# Patient Record
Sex: FEMALE | Race: BLACK OR AFRICAN AMERICAN | NOT HISPANIC OR LATINO | Employment: PART TIME | URBAN - METROPOLITAN AREA
[De-identification: names, ages, dates, MRNs, and addresses within clinical notes are randomized per-mention and may not be internally consistent; named-entity substitution may affect disease eponyms.]

---

## 2018-07-13 ENCOUNTER — HOSPITAL ENCOUNTER (EMERGENCY)
Facility: HOSPITAL | Age: 23
Discharge: HOME | End: 2018-07-13
Attending: EMERGENCY MEDICINE

## 2018-07-13 VITALS
HEIGHT: 62 IN | WEIGHT: 120 LBS | HEART RATE: 68 BPM | DIASTOLIC BLOOD PRESSURE: 74 MMHG | OXYGEN SATURATION: 98 % | SYSTOLIC BLOOD PRESSURE: 116 MMHG | BODY MASS INDEX: 22.08 KG/M2 | RESPIRATION RATE: 16 BRPM | TEMPERATURE: 99 F

## 2018-07-13 DIAGNOSIS — N64.4 BREAST PAIN: ICD-10-CM

## 2018-07-13 DIAGNOSIS — A60.00 GENITAL HERPES SIMPLEX, UNSPECIFIED SITE: Primary | ICD-10-CM

## 2018-07-13 LAB
B-HCG UR QL: NEGATIVE
BACTERIA GENITAL QL WET PREP: ABNORMAL
BILIRUB UR QL STRIP: NEGATIVE
CLARITY UR: CLEAR
CLUE CELLS VAG QL WET PREP: ABNORMAL
COLOR UR: YELLOW
CTP QC/QA: YES
FILAMENT FUNGI VAG WET PREP-#/AREA: ABNORMAL
GLUCOSE UR QL STRIP: NEGATIVE
HGB UR QL STRIP: ABNORMAL
KETONES UR QL STRIP: NEGATIVE
LEUKOCYTE ESTERASE UR QL STRIP: NEGATIVE
MICROSCOPIC COMMENT: ABNORMAL
NITRITE UR QL STRIP: NEGATIVE
PH UR STRIP: 6 [PH] (ref 5–8)
PROT UR QL STRIP: NEGATIVE
RBC #/AREA URNS HPF: 10 /HPF (ref 0–4)
SP GR UR STRIP: >=1.03 (ref 1–1.03)
SPECIMEN SOURCE: ABNORMAL
T VAGINALIS GENITAL QL WET PREP: ABNORMAL
URN SPEC COLLECT METH UR: ABNORMAL
UROBILINOGEN UR STRIP-ACNC: NEGATIVE EU/DL
WBC #/AREA URNS HPF: 5 /HPF (ref 0–5)
WBC #/AREA VAG WET PREP: ABNORMAL
YEAST GENITAL QL WET PREP: ABNORMAL

## 2018-07-13 PROCEDURE — 25000003 PHARM REV CODE 250: Performed by: NURSE PRACTITIONER

## 2018-07-13 PROCEDURE — 96372 THER/PROPH/DIAG INJ SC/IM: CPT

## 2018-07-13 PROCEDURE — 87491 CHLMYD TRACH DNA AMP PROBE: CPT

## 2018-07-13 PROCEDURE — 87210 SMEAR WET MOUNT SALINE/INK: CPT

## 2018-07-13 PROCEDURE — 81000 URINALYSIS NONAUTO W/SCOPE: CPT

## 2018-07-13 PROCEDURE — 81025 URINE PREGNANCY TEST: CPT | Performed by: NURSE PRACTITIONER

## 2018-07-13 PROCEDURE — 63600175 PHARM REV CODE 636 W HCPCS: Performed by: NURSE PRACTITIONER

## 2018-07-13 PROCEDURE — 99284 EMERGENCY DEPT VISIT MOD MDM: CPT | Mod: 25

## 2018-07-13 RX ORDER — ACYCLOVIR 400 MG/1
400 TABLET ORAL 3 TIMES DAILY
Qty: 40 TABLET | Refills: 3 | Status: SHIPPED | OUTPATIENT
Start: 2018-07-13 | End: 2024-03-18

## 2018-07-13 RX ORDER — CEFTRIAXONE 250 MG/1
250 INJECTION, POWDER, FOR SOLUTION INTRAMUSCULAR; INTRAVENOUS
Status: COMPLETED | OUTPATIENT
Start: 2018-07-13 | End: 2018-07-13

## 2018-07-13 RX ORDER — AZITHROMYCIN 250 MG/1
1000 TABLET, FILM COATED ORAL
Status: COMPLETED | OUTPATIENT
Start: 2018-07-13 | End: 2018-07-13

## 2018-07-13 RX ADMIN — AZITHROMYCIN MONOHYDRATE 1000 MG: 250 TABLET ORAL at 05:07

## 2018-07-13 RX ADMIN — CEFTRIAXONE SODIUM 250 MG: 250 INJECTION, POWDER, FOR SOLUTION INTRAMUSCULAR; INTRAVENOUS at 05:07

## 2018-07-13 NOTE — DISCHARGE INSTRUCTIONS
Please take prescribed medication as labeled. Follow-up with OB-GYN within 2-3 days and return to ED if symptoms worsen or change.

## 2018-07-13 NOTE — ED NOTES
Pt presents to the ED w/ c/o of vaginal pain when walking and sitting. Pt reports that vaginal pain began on Tuesday. Pt reports slight pain during sex. Pt reports white discharge from vagina. Pt also c/o of bilateral breast pain for the past few days. Denies any discharge coming from nipple.

## 2018-07-13 NOTE — ED PROVIDER NOTES
"Encounter Date: 7/13/2018       History     Chief Complaint   Patient presents with    Breast Pain     breast pain to brandy breasts and pain to R labia.      Pt is a 22-year-old female with pmhx of genital herpes who presents to ED with bilateral breast pain x1 week and pain to her right labia x3 days.  Patient reports that her mother and grandmother both have breast cancer and that she received a mammogram last year. Patient denies nipple discharge. Patient reports that she has had "a little" vaginal discharge x 2 days with "painful" urination. Pt denies any alleviating or exacerbating factors. LMP 7/7/18. Pt denies fever, chills, neck pain/stiffness, SOB, chest pain, hematuria, abdominal pain, N/V/D, urgency, frequency, vaginal bleeding, pain, and odor. Pt denies any other complaints at this time.       The history is provided by the patient.     Review of patient's allergies indicates:  No Known Allergies  History reviewed. No pertinent past medical history.  Past Surgical History:   Procedure Laterality Date    CYST REMOVAL       History reviewed. No pertinent family history.  Social History   Substance Use Topics    Smoking status: Never Smoker    Smokeless tobacco: Never Used    Alcohol use Yes      Comment: occasionally     Review of Systems   Constitutional: Negative for chills and fever.   HENT: Negative for sore throat.    Respiratory: Negative for shortness of breath.    Cardiovascular: Negative for chest pain.   Gastrointestinal: Negative for abdominal pain, diarrhea, nausea and vomiting.   Genitourinary: Positive for dysuria and vaginal discharge. Negative for decreased urine volume, difficulty urinating, enuresis, flank pain, frequency, genital sores, hematuria, pelvic pain, urgency, vaginal bleeding and vaginal pain.        Bilateral breast pain   Musculoskeletal: Negative for back pain, neck pain and neck stiffness.   Skin: Negative for rash.   Hematological: Does not bruise/bleed easily. "       Physical Exam     Initial Vitals [07/13/18 1543]   BP Pulse Resp Temp SpO2   120/73 86 16 98.5 °F (36.9 °C) 100 %      MAP       --         Physical Exam    Nursing note and vitals reviewed.  Constitutional: Vital signs are normal. She appears well-developed and well-nourished. She is not diaphoretic.  Non-toxic appearance. She does not have a sickly appearance.   HENT:   Head: Normocephalic.   Nose: Nose normal.   Mouth/Throat: Uvula is midline, oropharynx is clear and moist and mucous membranes are normal.   Neck: Trachea normal, normal range of motion, full passive range of motion without pain and phonation normal. Neck supple.   Cardiovascular: Normal rate, regular rhythm and normal heart sounds.   Pulmonary/Chest: Effort normal and breath sounds normal. Right breast exhibits tenderness. Right breast exhibits no inverted nipple, no mass, no nipple discharge and no skin change. Left breast exhibits tenderness. Left breast exhibits no inverted nipple, no mass, no nipple discharge and no skin change. Breasts are symmetrical. There is no breast swelling.       TTP to bilateral breasts. No nipple discharge or inversion of nipples noted.    Abdominal: Soft. Normal appearance and bowel sounds are normal. There is no tenderness.   Genitourinary: Rectum normal and uterus normal. No breast bleeding. Pelvic exam was performed with patient supine. No labial fusion. There is rash and tenderness on the right labia. There is no lesion or injury on the right labia. There is rash and tenderness on the left labia. There is no lesion or injury on the left labia. Uterus is not deviated, not enlarged, not fixed and not tender. Cervix exhibits discharge. Cervix exhibits no motion tenderness and no friability. Right adnexum displays no mass, no tenderness and no fullness. Left adnexum displays no mass, no tenderness and no fullness. No erythema, tenderness or bleeding in the vagina. No foreign body in the vagina. No signs of  injury around the vagina. Vaginal discharge found.   Genitourinary Comments: Yellowish-clear d/c noted in vaginal vault and cervix. No CMT. No adnexal masses or tenderness. Small ulcerations consistent with herpes virus on bilateral labia with swelling.    Neurological: She is alert and oriented to person, place, and time. Gait normal. GCS eye subscore is 4. GCS verbal subscore is 5. GCS motor subscore is 6.   Skin: Skin is warm and intact. Capillary refill takes less than 2 seconds. No rash noted.   Psychiatric: She has a normal mood and affect.         ED Course   Procedures  Labs Reviewed   URINALYSIS, REFLEX TO URINE CULTURE - Abnormal; Notable for the following:        Result Value    Specific Gravity, UA >=1.030 (*)     Occult Blood UA 1+ (*)     All other components within normal limits    Narrative:     Preferred Collection Type->Urine, Clean Catch   VAGINAL SCREEN - Abnormal; Notable for the following:     WBC - Vaginal Screen Moderate (*)     Bacteria - Vaginal Screen Many (*)     All other components within normal limits   URINALYSIS MICROSCOPIC - Abnormal; Notable for the following:     RBC, UA 10 (*)     All other components within normal limits    Narrative:     Preferred Collection Type->Urine, Clean Catch   C. TRACHOMATIS/N. GONORRHOEAE BY AMP DNA   POCT URINE PREGNANCY          Imaging Results    None          Medical Decision Making:   Initial Assessment:   Patient presents to ED with bilateral breast pain x1 week and pain to her right labia x3 days.  Patient appears well, nontoxic.  Patient afebrile.  TTP to bilateral breasts.  No nipple discharge or nipple inversion noted. No CMT.  No adnexal masses or tenderness.   Differential Diagnosis:   UTI, pyelonephritis, STD, PID, BV, fibrocystic breasts  ED Management:  POCT pregnancy, urinalysis, pelvic exam, wet prep, GC/chlamydia, 1000 mg PO azithromycin, 250 IM rocephin   Negative UPT. Urinalysis unremarkable.  Due to the result of patient's pelvic  exam patient will be empirically treated for GC and Chlamydia in ED with azithromycin and Rocephin.  Patient is stable will be DC home with prescription for acyclovir for genital herpes.  Patient instructed to follow up with OB-GYN within 2-3 days and to return to ED if symptoms worsen or change.    Rx: acyclovir                       Clinical Impression:   The primary encounter diagnosis was Genital herpes simplex, unspecified site. A diagnosis of Breast pain was also pertinent to this visit.                             Mick Naik NP  07/13/18 2943

## 2018-07-14 LAB
C TRACH DNA SPEC QL NAA+PROBE: NOT DETECTED
N GONORRHOEA DNA SPEC QL NAA+PROBE: NOT DETECTED

## 2018-09-28 ENCOUNTER — HOSPITAL ENCOUNTER (EMERGENCY)
Facility: HOSPITAL | Age: 23
Discharge: HOME | End: 2018-09-28
Attending: EMERGENCY MEDICINE

## 2018-09-28 VITALS
HEIGHT: 62 IN | HEART RATE: 78 BPM | OXYGEN SATURATION: 100 % | DIASTOLIC BLOOD PRESSURE: 80 MMHG | TEMPERATURE: 99 F | BODY MASS INDEX: 24.51 KG/M2 | RESPIRATION RATE: 18 BRPM | SYSTOLIC BLOOD PRESSURE: 128 MMHG | WEIGHT: 133.19 LBS

## 2018-09-28 DIAGNOSIS — N89.8 VAGINAL DISCHARGE: Primary | ICD-10-CM

## 2018-09-28 LAB
B-HCG UR QL: NEGATIVE
BACTERIA GENITAL QL WET PREP: ABNORMAL
BILIRUB UR QL STRIP: NEGATIVE
CLARITY UR: CLEAR
CLUE CELLS VAG QL WET PREP: ABNORMAL
COLOR UR: YELLOW
CTP QC/QA: YES
FILAMENT FUNGI VAG WET PREP-#/AREA: ABNORMAL
GLUCOSE UR QL STRIP: NEGATIVE
HGB UR QL STRIP: ABNORMAL
KETONES UR QL STRIP: NEGATIVE
LEUKOCYTE ESTERASE UR QL STRIP: NEGATIVE
NITRITE UR QL STRIP: NEGATIVE
PH UR STRIP: 7 [PH] (ref 5–8)
PROT UR QL STRIP: NEGATIVE
SP GR UR STRIP: 1.02 (ref 1–1.03)
SPECIMEN SOURCE: ABNORMAL
T VAGINALIS GENITAL QL WET PREP: ABNORMAL
URN SPEC COLLECT METH UR: ABNORMAL
UROBILINOGEN UR STRIP-ACNC: 1 EU/DL
WBC #/AREA VAG WET PREP: ABNORMAL
YEAST GENITAL QL WET PREP: ABNORMAL

## 2018-09-28 PROCEDURE — 81025 URINE PREGNANCY TEST: CPT | Performed by: NURSE PRACTITIONER

## 2018-09-28 PROCEDURE — 96372 THER/PROPH/DIAG INJ SC/IM: CPT

## 2018-09-28 PROCEDURE — 63600175 PHARM REV CODE 636 W HCPCS: Performed by: NURSE PRACTITIONER

## 2018-09-28 PROCEDURE — 87210 SMEAR WET MOUNT SALINE/INK: CPT

## 2018-09-28 PROCEDURE — 87491 CHLMYD TRACH DNA AMP PROBE: CPT

## 2018-09-28 PROCEDURE — 81003 URINALYSIS AUTO W/O SCOPE: CPT

## 2018-09-28 PROCEDURE — 25000003 PHARM REV CODE 250: Performed by: NURSE PRACTITIONER

## 2018-09-28 PROCEDURE — 99284 EMERGENCY DEPT VISIT MOD MDM: CPT | Mod: 25

## 2018-09-28 RX ORDER — AZITHROMYCIN 250 MG/1
1000 TABLET, FILM COATED ORAL
Status: COMPLETED | OUTPATIENT
Start: 2018-09-28 | End: 2018-09-28

## 2018-09-28 RX ORDER — CEFTRIAXONE 250 MG/1
250 INJECTION, POWDER, FOR SOLUTION INTRAMUSCULAR; INTRAVENOUS
Status: COMPLETED | OUTPATIENT
Start: 2018-09-28 | End: 2018-09-28

## 2018-09-28 RX ADMIN — AZITHROMYCIN MONOHYDRATE 1000 MG: 250 TABLET ORAL at 04:09

## 2018-09-28 RX ADMIN — CEFTRIAXONE SODIUM 250 MG: 250 INJECTION, POWDER, FOR SOLUTION INTRAMUSCULAR; INTRAVENOUS at 04:09

## 2018-09-28 NOTE — ED PROVIDER NOTES
Encounter Date: 9/28/2018       History     Chief Complaint   Patient presents with    Female  Problem     pelvic pain, vaginal discharge with foul smell for 3 days     LMP 9/24/18.      The history is provided by the patient.   Female  Problem   Primary symptoms include discharge, genital odor.    Primary symptoms include no pelvic pain, no fever, no genital lesions, no genital pain, no genital rash, no genital itching, no dysuria, and no vaginal bleeding.   This is a new problem. Illness onset: one week. The problem occurs constantly. The problem has been unchanged. The symptoms occur after intercourse (unprotected sex two weeks ago). She is not pregnant. The discharge was white, thick and malodorous. Pertinent negatives include no anorexia, no abdominal swelling, no abdominal pain, no constipation, no diarrhea, no nausea, no vomiting and no frequency. She has tried nothing for the symptoms. Sexual activity: sexually active. There is a concern regarding sexually transmitted diseases. She uses oral contraceptives for contraception. Associated medical issues include STD and herpes simplex. Associated medical issues do not include PID, vaginosis, ovarian cysts or ectopic pregnancy.     Review of patient's allergies indicates:  No Known Allergies  History reviewed. No pertinent past medical history.  Past Surgical History:   Procedure Laterality Date    CYST REMOVAL       History reviewed. No pertinent family history.  Social History     Tobacco Use    Smoking status: Never Smoker    Smokeless tobacco: Never Used   Substance Use Topics    Alcohol use: Yes     Comment: occasionally    Drug use: No     Review of Systems   Constitutional: Negative for fever.   Gastrointestinal: Negative for abdominal pain, anorexia, constipation, diarrhea, nausea and vomiting.   Genitourinary: Positive for vaginal discharge. Negative for decreased urine volume, difficulty urinating, dysuria, flank pain, frequency, genital  sores, hematuria, pelvic pain, urgency, vaginal bleeding and vaginal pain.        Vaginal odor   Musculoskeletal: Negative for back pain, gait problem, neck pain and neck stiffness.   Skin: Negative for rash.   Hematological: Does not bruise/bleed easily.   All other systems reviewed and are negative.      Physical Exam     Initial Vitals [09/28/18 1521]   BP Pulse Resp Temp SpO2   120/66 92 16 98.2 °F (36.8 °C) 100 %      MAP       --         Physical Exam    Vitals reviewed.  Constitutional: She appears well-developed and well-nourished. She is not diaphoretic. She is cooperative.  Non-toxic appearance. She does not have a sickly appearance.   No acute distress.   HENT:   Head: Normocephalic.   Eyes: Pupils are equal, round, and reactive to light.   Neck: Normal range of motion, full passive range of motion without pain and phonation normal. Neck supple.   Cardiovascular: Regular rhythm.   Pulmonary/Chest: No respiratory distress.   Abdominal:   Abdomen soft and non-tender.  Normal bowel sounds.  No guarding, rigidity, rebound.  No CVA tenderness.     Genitourinary:   Genitourinary Comments: No lesions or vesicles noted.  Thick, white discharge noted in vaginal vault and cervix. No CMT.   No adnexal masses or tenderness.    Neurological: She is alert and oriented to person, place, and time.   Steady gait.    Skin: Skin is warm, dry and intact. No rash noted.   No rash on palms or soles.   Psychiatric: She has a normal mood and affect.         ED Course   Procedures  Labs Reviewed   URINALYSIS, REFLEX TO URINE CULTURE - Abnormal; Notable for the following components:       Result Value    Occult Blood UA Trace (*)     All other components within normal limits    Narrative:     Preferred Collection Type->Urine, Clean Catch   VAGINAL SCREEN - Abnormal; Notable for the following components:    Bacteria - Vaginal Screen Few (*)     All other components within normal limits   C. TRACHOMATIS/N. GONORRHOEAE BY AMP DNA    POCT URINE PREGNANCY          Imaging Results    None          Medical Decision Making:   Initial Assessment:   Patient presents to ED with vaginal discharge and odor x1 week.  Appears well, nontoxic.  Afebrile. Abdomen soft and non-tender. Normal bowel sounds.  No guarding, rigidity, rebound.  No CVA tenderness. No lesions or vesicles noted.  Thick, white discharge noted in vaginal vault and cervix.  No CMT.  No adnexal masses or tenderness.  Steady gait.   Differential Diagnosis:   UTI, pyelonephritis, BV, PID, GC/chlamydia, genital herpes   Clinical Tests:   Lab Tests: Ordered and Reviewed  ED Management:  POCT pregnancy, urinalysis, pelvic exam, wet prep, GC/chalymdia, 1000 mg PO azithromycin, 250 IM rocephin   Negative UPT. Urinalysis negative for nitrates and leokocytosis. Wet prep unremarkable. Pt requests to be treated for GC/chalmydia and given 1000 mg PO azithromycin 250 IM Rocephin in ED.  No signs of pyelonephritis or acute surgical abdomen.  Patient is stable and will be DC home.  Patient instructed to follow up with daughters of [facility] tomorrow and to return to ED for any concerns.  Patient verbalizes DC instructions and is in compliance and agreement with treatment plan.              Attending Attestation:     Physician Attestation Statement for NP/PA:   I discussed this assessment and plan of this patient with the NP/PA, but I did not personally examine the patient. The face to face encounter was performed by the NP/PA.    Other NP/PA Attestation Additions:    History of Present Illness: Vaginal discharge    Medical Decision Making: Treat empirically with azithromycin and rocephin.  Gonorrhea/chlamydia pending.  No evidence of PID on exam.                    Clinical Impression:   The encounter diagnosis was Vaginal discharge.                             Mick Naik NP  09/28/18 172       Alta Berger MD  09/28/18 1818

## 2018-09-28 NOTE — ED NOTES
Pt here c/o having unprotected sex and now having vaginal odor and discharge that is foul. No other c/o. Denies urinary s/s.

## 2018-09-29 LAB
C TRACH DNA SPEC QL NAA+PROBE: NOT DETECTED
N GONORRHOEA DNA SPEC QL NAA+PROBE: NOT DETECTED

## 2019-05-20 ENCOUNTER — HOSPITAL ENCOUNTER (EMERGENCY)
Facility: HOSPITAL | Age: 24
Discharge: HOME | End: 2019-05-20
Attending: SURGERY

## 2019-05-20 VITALS
WEIGHT: 125 LBS | RESPIRATION RATE: 18 BRPM | BODY MASS INDEX: 23 KG/M2 | DIASTOLIC BLOOD PRESSURE: 74 MMHG | HEIGHT: 62 IN | HEART RATE: 98 BPM | TEMPERATURE: 98 F | OXYGEN SATURATION: 100 % | SYSTOLIC BLOOD PRESSURE: 116 MMHG

## 2019-05-20 DIAGNOSIS — R52 PAIN: ICD-10-CM

## 2019-05-20 DIAGNOSIS — L03.119 CELLULITIS AND ABSCESS OF FOOT: Primary | ICD-10-CM

## 2019-05-20 DIAGNOSIS — L02.619 CELLULITIS AND ABSCESS OF FOOT: Primary | ICD-10-CM

## 2019-05-20 PROCEDURE — 25000003 PHARM REV CODE 250: Mod: ER | Performed by: SURGERY

## 2019-05-20 PROCEDURE — 99284 EMERGENCY DEPT VISIT MOD MDM: CPT | Mod: 25,ER

## 2019-05-20 RX ORDER — SULFAMETHOXAZOLE AND TRIMETHOPRIM 800; 160 MG/1; MG/1
1 TABLET ORAL 2 TIMES DAILY
Qty: 14 TABLET | Refills: 0 | Status: SHIPPED | OUTPATIENT
Start: 2019-05-20 | End: 2019-05-27

## 2019-05-20 RX ORDER — BUTALBITAL, ACETAMINOPHEN, CAFFEINE AND CODEINE PHOSPHATE 300; 50; 40; 30 MG/1; MG/1; MG/1; MG/1
1 CAPSULE ORAL EVERY 4 HOURS
Qty: 12 CAPSULE | Refills: 0 | Status: SHIPPED | OUTPATIENT
Start: 2019-05-20 | End: 2020-10-08

## 2019-05-20 RX ORDER — HYDROCODONE BITARTRATE AND ACETAMINOPHEN 10; 325 MG/1; MG/1
1 TABLET ORAL
Status: COMPLETED | OUTPATIENT
Start: 2019-05-20 | End: 2019-05-20

## 2019-05-20 RX ORDER — CEPHALEXIN 500 MG/1
500 CAPSULE ORAL EVERY 8 HOURS
Qty: 21 CAPSULE | Refills: 0 | Status: SHIPPED | OUTPATIENT
Start: 2019-05-20 | End: 2019-05-27

## 2019-05-20 RX ORDER — CEPHALEXIN 500 MG/1
500 CAPSULE ORAL
Status: COMPLETED | OUTPATIENT
Start: 2019-05-20 | End: 2019-05-20

## 2019-05-20 RX ADMIN — HYDROCODONE BITARTRATE AND ACETAMINOPHEN 1 TABLET: 10; 325 TABLET ORAL at 08:05

## 2019-05-20 RX ADMIN — CEPHALEXIN 500 MG: 500 CAPSULE ORAL at 08:05

## 2019-05-20 NOTE — ED PROVIDER NOTES
Encounter Date: 5/20/2019       History     Chief Complaint   Patient presents with    Foot Pain     Left foot/pinky toe pain since Saturday after wearing tight shoes at the club.  (+) swelling/redness/pain, (+) cap refill and distal pulses     Left 4th and 5th toe pain and swelling since Saturday night after wearing tight shoes.  Denies trauma    The history is provided by the patient.   Foot Pain   This is a new problem. The current episode started 2 days ago. The problem occurs constantly. The problem has been gradually worsening. Pertinent negatives include no chest pain, no abdominal pain, no headaches and no shortness of breath. The symptoms are aggravated by walking and exertion. Nothing relieves the symptoms. She has tried nothing for the symptoms. The treatment provided no relief.     Review of patient's allergies indicates:  No Known Allergies  History reviewed. No pertinent past medical history.  Past Surgical History:   Procedure Laterality Date    CYST REMOVAL       History reviewed. No pertinent family history.  Social History     Tobacco Use    Smoking status: Never Smoker    Smokeless tobacco: Never Used   Substance Use Topics    Alcohol use: Yes     Comment: occasionally    Drug use: No     Review of Systems   Constitutional: Negative.    HENT: Negative.    Eyes: Negative.    Respiratory: Negative.  Negative for shortness of breath.    Cardiovascular: Negative.  Negative for chest pain.   Gastrointestinal: Negative.  Negative for abdominal pain.   Endocrine: Negative.    Genitourinary: Negative.    Musculoskeletal: Positive for joint swelling.   Skin: Positive for rash.   Allergic/Immunologic: Negative.    Neurological: Negative.  Negative for headaches.   Hematological: Negative.    Psychiatric/Behavioral: Negative.        Physical Exam     Initial Vitals [05/20/19 0607]   BP Pulse Resp Temp SpO2   119/82 100 18 98.5 °F (36.9 °C) 100 %      MAP       --         Physical Exam    Nursing note  and vitals reviewed.  Constitutional: She appears well-developed.   HENT:   Head: Normocephalic.   Cardiovascular: Intact distal pulses.   Musculoskeletal: She exhibits tenderness.        Feet:    Skin: Skin is warm. There is erythema.   Psychiatric: She has a normal mood and affect.         ED Course   Procedures  Labs Reviewed - No data to display       Imaging Results          X-Ray Foot Complete Left (Final result)  Result time 05/20/19 08:05:14    Final result by LISBETH Mujica Sr., MD (05/20/19 08:05:14)                 Impression:      Normal study.      Electronically signed by: Robert Mujica MD  Date:    05/20/2019  Time:    08:05             Narrative:    EXAMINATION:  XR FOOT COMPLETE 3 VIEW LEFT    CLINICAL HISTORY:  Pain, unspecified    COMPARISON:  None    FINDINGS:  There is no fracture. There is no dislocation.                                 Medical Decision Making:   Initial Assessment:   Cellulitis of left 4th and 5th toe  ED Management:  Patient was given Keflex and Bactrim for strep and staph coverage                      Clinical Impression:       ICD-10-CM ICD-9-CM   1. Cellulitis and abscess of foot L03.119 682.7    L02.619    2. Pain R52 780.96         Disposition:   Disposition: Discharged  Condition: Stable                        KAMLESH Lloyd III, MD  05/20/19 3429

## 2019-06-06 ENCOUNTER — HOSPITAL ENCOUNTER (EMERGENCY)
Facility: HOSPITAL | Age: 24
Discharge: HOME | End: 2019-06-06
Attending: EMERGENCY MEDICINE

## 2019-06-06 VITALS
TEMPERATURE: 98 F | HEIGHT: 62 IN | HEART RATE: 95 BPM | OXYGEN SATURATION: 100 % | DIASTOLIC BLOOD PRESSURE: 87 MMHG | RESPIRATION RATE: 18 BRPM | WEIGHT: 135 LBS | SYSTOLIC BLOOD PRESSURE: 128 MMHG | BODY MASS INDEX: 24.84 KG/M2

## 2019-06-06 DIAGNOSIS — B35.3 TINEA PEDIS OF BOTH FEET: Primary | ICD-10-CM

## 2019-06-06 PROCEDURE — 99283 EMERGENCY DEPT VISIT LOW MDM: CPT | Mod: ER

## 2019-06-06 RX ORDER — TERBINAFINE HYDROCHLORIDE 250 MG/1
250 TABLET ORAL DAILY
Qty: 10 TABLET | Refills: 2 | Status: SHIPPED | OUTPATIENT
Start: 2019-06-06 | End: 2019-06-16

## 2019-06-06 RX ORDER — TOLNAFTATE 10 MG/G
CREAM TOPICAL 2 TIMES DAILY
Refills: 0 | COMMUNITY
Start: 2019-06-06 | End: 2020-10-08

## 2019-06-07 NOTE — ED PROVIDER NOTES
Encounter Date: 6/6/2019       History     Chief Complaint   Patient presents with    Foot Pain     R foot pain X 2 days.  Erythema, swelling noted.  Wound between 2nd and 3rd toe noted. Irritation noted between all other toes.  Pt denies injury.     The history is provided by the patient.   Foot Injury    The incident occurred at home. The injury mechanism is unknown. The incident occurred several days ago. The pain is present in the left foot and right foot. The quality of the pain is described as aching. The pain is at a severity of 3/10. The pain has been constant since onset. Associated symptoms include tingling. Pertinent negatives include no numbness, no inability to bear weight, no loss of motion, no muscle weakness and no loss of sensation. Associated symptoms comments: Itching  . She reports no foreign bodies present. Nothing aggravates the symptoms. Treatments tried: antibiotics. The treatment provided no relief.     Review of patient's allergies indicates:  No Known Allergies  History reviewed. No pertinent past medical history.  Past Surgical History:   Procedure Laterality Date    CYST REMOVAL       History reviewed. No pertinent family history.  Social History     Tobacco Use    Smoking status: Never Smoker    Smokeless tobacco: Never Used   Substance Use Topics    Alcohol use: Yes     Comment: occasionally    Drug use: No     Review of Systems   Skin: Positive for wound.   Neurological: Positive for tingling. Negative for numbness.   All other systems reviewed and are negative.      Physical Exam     Initial Vitals [06/06/19 2045]   BP Pulse Resp Temp SpO2   128/87 95 18 97.9 °F (36.6 °C) 100 %      MAP       --         Physical Exam    Nursing note and vitals reviewed.  Constitutional: She appears well-developed and well-nourished.   HENT:   Head: Normocephalic and atraumatic.   Eyes: Conjunctivae and EOM are normal.   Neck: Normal range of motion. Neck supple.   Cardiovascular: Normal rate,  regular rhythm and normal heart sounds.   Pulmonary/Chest: Breath sounds normal. No respiratory distress. She has no wheezes. She has no rhonchi. She has no rales.   Abdominal: Soft. There is no tenderness. There is no rebound and no guarding.   Musculoskeletal: Normal range of motion.   Neurological: She is alert and oriented to person, place, and time. GCS score is 15. GCS eye subscore is 4. GCS verbal subscore is 5. GCS motor subscore is 6.   Skin: Skin is warm and dry. Capillary refill takes less than 2 seconds.        Psychiatric: She has a normal mood and affect. Her behavior is normal. Judgment and thought content normal.         ED Course   Procedures  Labs Reviewed - No data to display       Imaging Results    None                               Clinical Impression:       ICD-10-CM ICD-9-CM   1. Tinea pedis of both feet B35.3 110.4         Disposition:   Disposition: Discharged  Condition: Stable                        Saige Burroughs MD  06/06/19 [id number]

## 2020-10-08 ENCOUNTER — HOSPITAL ENCOUNTER (EMERGENCY)
Facility: HOSPITAL | Age: 25
Discharge: HOME | End: 2020-10-08
Attending: EMERGENCY MEDICINE

## 2020-10-08 VITALS
TEMPERATURE: 98 F | HEIGHT: 61 IN | RESPIRATION RATE: 16 BRPM | SYSTOLIC BLOOD PRESSURE: 125 MMHG | WEIGHT: 140 LBS | DIASTOLIC BLOOD PRESSURE: 82 MMHG | BODY MASS INDEX: 26.43 KG/M2 | OXYGEN SATURATION: 99 % | HEART RATE: 82 BPM

## 2020-10-08 DIAGNOSIS — R07.89 CHEST WALL PAIN: Primary | ICD-10-CM

## 2020-10-08 DIAGNOSIS — R07.9 CHEST PAIN: ICD-10-CM

## 2020-10-08 DIAGNOSIS — R07.9 LEFT-SIDED CHEST PAIN: ICD-10-CM

## 2020-10-08 LAB
B-HCG UR QL: NEGATIVE
BILIRUB UR QL STRIP: NEGATIVE
CLARITY UR: CLEAR
COLOR UR: YELLOW
CTP QC/QA: YES
GLUCOSE UR QL STRIP: NEGATIVE
HGB UR QL STRIP: NEGATIVE
KETONES UR QL STRIP: NEGATIVE
LEUKOCYTE ESTERASE UR QL STRIP: NEGATIVE
NITRITE UR QL STRIP: NEGATIVE
PH UR STRIP: >8 [PH] (ref 5–8)
PROT UR QL STRIP: NEGATIVE
SP GR UR STRIP: 1.02 (ref 1–1.03)
URN SPEC COLLECT METH UR: ABNORMAL
UROBILINOGEN UR STRIP-ACNC: NEGATIVE EU/DL

## 2020-10-08 PROCEDURE — 81003 URINALYSIS AUTO W/O SCOPE: CPT

## 2020-10-08 PROCEDURE — 99285 EMERGENCY DEPT VISIT HI MDM: CPT | Mod: 25

## 2020-10-08 PROCEDURE — 81025 URINE PREGNANCY TEST: CPT | Performed by: EMERGENCY MEDICINE

## 2020-10-08 PROCEDURE — 93010 ELECTROCARDIOGRAM REPORT: CPT | Mod: ,,, | Performed by: INTERNAL MEDICINE

## 2020-10-08 PROCEDURE — 93010 EKG 12-LEAD: ICD-10-PCS | Mod: ,,, | Performed by: INTERNAL MEDICINE

## 2020-10-08 PROCEDURE — 93005 ELECTROCARDIOGRAM TRACING: CPT

## 2020-10-08 PROCEDURE — 25000003 PHARM REV CODE 250: Performed by: EMERGENCY MEDICINE

## 2020-10-08 RX ORDER — IBUPROFEN 400 MG/1
400 TABLET ORAL
Status: COMPLETED | OUTPATIENT
Start: 2020-10-08 | End: 2020-10-08

## 2020-10-08 RX ORDER — IBUPROFEN 400 MG/1
400 TABLET ORAL EVERY 6 HOURS PRN
Qty: 20 TABLET | Refills: 0 | Status: SHIPPED | OUTPATIENT
Start: 2020-10-08 | End: 2022-06-16 | Stop reason: CLARIF

## 2020-10-08 RX ADMIN — IBUPROFEN 400 MG: 400 TABLET, FILM COATED ORAL at 09:10

## 2020-10-08 NOTE — ED PROVIDER NOTES
Encounter Date: 10/8/2020    SCRIBE #1 NOTE: I, Dary Cesar, am scribing for, and in the presence of, Dr. Lainez.       History     Chief Complaint   Patient presents with    Chest Pain     Sharp, left sided chest pain since starting work today. States pain began after liftiing items at work. Presents in no distress - states pain worse with movement. No distress noted.      Nicola Grande is a 24 y.o. female who  has no past medical history on file.    The patient presents to the ED due to chest pain. Onset of pain began this morning and is located to the left chest wall area/ under the left breast. Patient took 2 GasX without relief. Patient has had chest pain like this before and symptoms resolved on their own. Patient reports increased pain with inhalation. Patient denies cough, fever, nausea, vomiting, diarrhea, rash or swelling anywhere. Patient notes having an birth control implant that is  in her left arm. She denies any surgeries in the past. Patient denies any recent travel.     The history is provided by the patient. No  was used.     Review of patient's allergies indicates:  No Known Allergies  History reviewed. No pertinent past medical history.  Past Surgical History:   Procedure Laterality Date    CYST REMOVAL       History reviewed. No pertinent family history.  Social History     Tobacco Use    Smoking status: Never Smoker    Smokeless tobacco: Never Used   Substance Use Topics    Alcohol use: Yes     Comment: occasionally    Drug use: No     Review of Systems   Constitutional: Negative for fever.   HENT: Negative for sore throat.    Respiratory: Negative for shortness of breath.    Cardiovascular: Positive for chest pain.   Gastrointestinal: Negative for nausea.   Genitourinary: Negative for dysuria.   Musculoskeletal: Negative for back pain.   Skin: Negative for rash.   Neurological: Negative for weakness.   Hematological: Does not bruise/bleed easily.        Physical Exam     Initial Vitals [10/08/20 0903]   BP Pulse Resp Temp SpO2   125/82 82 16 98.4 °F (36.9 °C) 99 %      MAP       --         Physical Exam    Nursing note and vitals reviewed.  Constitutional: She appears well-developed and well-nourished. She is not diaphoretic. No distress.   HENT:   Head: Normocephalic and atraumatic.   Eyes: Conjunctivae and EOM are normal.   Neck: Normal range of motion. Neck supple.   Cardiovascular: Normal rate, regular rhythm and normal heart sounds.   Pulmonary/Chest: Breath sounds normal. No respiratory distress.   Abdominal: Soft. There is no abdominal tenderness.   Musculoskeletal: Normal range of motion. Tenderness present. No edema.      Comments: Mild tenderness to palpation to left chest wall.    Neurological: She is alert and oriented to person, place, and time. She has normal strength.   Skin: Skin is warm and dry. Capillary refill takes less than 2 seconds.         ED Course   Procedures  Labs Reviewed   URINALYSIS, REFLEX TO URINE CULTURE - Abnormal; Notable for the following components:       Result Value    pH, UA >8.0 (*)     All other components within normal limits    Narrative:     Specimen Source->Urine   POCT URINE PREGNANCY        ECG Results          EKG 12-lead (Final result)  Result time 10/08/20 12:28:01    Final result by Interface, Lab In [facility] (10/08/20 12:28:01)                 Narrative:    Test Reason : R07.9,    Vent. Rate : 076 BPM     Atrial Rate : 076 BPM     P-R Int : 126 ms          QRS Dur : 062 ms      QT Int : 384 ms       P-R-T Axes : 070 054 030 degrees     QTc Int : 432 ms    Normal sinus rhythm with sinus arrhythmia  Normal ECG  When compared with ECG of 01-SEP-2013 20:56,  No significant change was found  Confirmed by Maik GARCIA MD, Luis Enrique CERON ([id number]) on 10/8/2020 12:27:52 PM    Referred By: [email or web address]   SELF           Confirmed By:Luis Enrique Pleitez III, MD                            Imaging Results          X-Ray Chest AP  Portable (Final result)  Result time 10/08/20 10:16:02    Final result by Loren Majano MD (10/08/20 10:16:02)                 Impression:      No lung parenchymal or pleural abnormality or cardiomegaly.      Electronically signed by: Loren Majano  Date:    10/08/2020  Time:    10:16             Narrative:    EXAMINATION:  XR CHEST AP PORTABLE    CLINICAL HISTORY:  Chest pain, unspecified    TECHNIQUE:  Single frontal view of the chest was performed.    COMPARISON:  09/01/2013 PA lateral chest    FINDINGS:  The cardiomediastinal silhouette is stable and nonenlarged.  The lungs appear clear.  There is no pleural effusion or pneumothorax.  Osseous structures appear intact.                                 Medical Decision Making:   History:   Old Medical Records: I decided to obtain old medical records.  Initial Assessment:   This is a healthy 25 y/o female who presents with left sided chest pain. [id number].   Differential Diagnosis:   Muscle sprain, muscle strain, costochondritis, pneumothorax, considered although lower likeleyhood of PE at this time. (PERC negative)  Independently Interpreted Test(s):   I have ordered and independently interpreted X-rays - see prior notes.  I have ordered and independently interpreted EKG Reading(s) - see prior notes  Clinical Tests:   Lab Tests: Ordered and Reviewed  Radiological Study: Ordered and Reviewed  Medical Tests: Ordered and Reviewed    Additional MDM:   PERC Rule:   Age is greater than or equal to 50 = 0.0  Heart Rate is greater than or equal to 100 = 0.0  SaO2 on room air < 95% = 0.0  Unilateral leg swelling = 0.0  Hemoptysis = 0.0  Recent surgery or trauma = 0.0  Prior PE or DVT =  0.0  Hormone use = 0.00  PERC Score = 0                          Clinical Impression:       ICD-10-CM ICD-9-CM   1. Chest wall pain  R07.89 786.52   2. Chest pain  R07.9 786.50   3. Left-sided chest pain  R07.9 786.50                      Disposition:   Disposition:  Discharged  Condition: Stable     ED Disposition Condition    Discharge Stable        ED Prescriptions     Medication Sig Dispense Start Date End Date Auth. Provider    ibuprofen (ADVIL,MOTRIN) 400 MG tablet Take 1 tablet (400 mg total) by mouth every 6 (six) hours as needed. 20 tablet 10/8/2020  Bjorn Loza Jr., MD        Follow-up Information     Follow up With Specialties Details Why Contact Info    [facility]  If symptoms worsen [address] [id number] [phone]                            I, Bjorn Loza,  personally performed the services described in this documentation. All medical record entries made by the scribe were at my direction and in my presence.  I have reviewed the chart and agree that the record reflects my personal performance and is accurate and complete. Bjorn Loza Jr., MD  10/09/20 5222

## 2020-10-08 NOTE — ED TRIAGE NOTES
Pt presents to ED with C/P R /chest pain with onset this am while at work. 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 Pt states more pain with deep breaths. Pt states pain is 8/10 at this time. Pt states the pain is sharp to the area.

## 2020-10-08 NOTE — ED TRIAGE NOTES
Describes sharp, left sided chest pain that is worse with movement. Pain began while at work today, shortly after lifting some objects on shelf. No SOB or h/o cough/fever. No distress noted.

## 2021-03-10 ENCOUNTER — HOSPITAL ENCOUNTER (EMERGENCY)
Facility: HOSPITAL | Age: 26
Discharge: HOME | End: 2021-03-10
Attending: EMERGENCY MEDICINE
Payer: COMMERCIAL

## 2021-03-10 VITALS
HEART RATE: 98 BPM | WEIGHT: 140 LBS | TEMPERATURE: 98 F | OXYGEN SATURATION: 99 % | HEIGHT: 62 IN | SYSTOLIC BLOOD PRESSURE: 127 MMHG | RESPIRATION RATE: 18 BRPM | DIASTOLIC BLOOD PRESSURE: 72 MMHG | BODY MASS INDEX: 25.76 KG/M2

## 2021-03-10 DIAGNOSIS — N72 CERVICITIS: Primary | ICD-10-CM

## 2021-03-10 LAB
B-HCG UR QL: NEGATIVE
BACTERIA #/AREA URNS HPF: ABNORMAL /HPF
BACTERIA GENITAL QL WET PREP: ABNORMAL
BILIRUB UR QL STRIP: NEGATIVE
CLARITY UR: ABNORMAL
CLUE CELLS VAG QL WET PREP: ABNORMAL
COLOR UR: ABNORMAL
CTP QC/QA: YES
FILAMENT FUNGI VAG WET PREP-#/AREA: ABNORMAL
GLUCOSE UR QL STRIP: NEGATIVE
HGB UR QL STRIP: ABNORMAL
HYALINE CASTS #/AREA URNS LPF: 0 /LPF
KETONES UR QL STRIP: NEGATIVE
LEUKOCYTE ESTERASE UR QL STRIP: ABNORMAL
MICROSCOPIC COMMENT: ABNORMAL
NITRITE UR QL STRIP: NEGATIVE
PH UR STRIP: 6 [PH] (ref 5–8)
PROT UR QL STRIP: ABNORMAL
RBC #/AREA URNS HPF: >100 /HPF (ref 0–4)
SP GR UR STRIP: 1.03 (ref 1–1.03)
SPECIMEN SOURCE: ABNORMAL
SQUAMOUS #/AREA URNS HPF: 8 /HPF
T VAGINALIS GENITAL QL WET PREP: ABNORMAL
URN SPEC COLLECT METH UR: ABNORMAL
UROBILINOGEN UR STRIP-ACNC: NEGATIVE EU/DL
WBC #/AREA URNS HPF: 30 /HPF (ref 0–5)
WBC #/AREA VAG WET PREP: ABNORMAL
YEAST GENITAL QL WET PREP: ABNORMAL

## 2021-03-10 PROCEDURE — 81025 URINE PREGNANCY TEST: CPT | Performed by: EMERGENCY MEDICINE

## 2021-03-10 PROCEDURE — 99283 EMERGENCY DEPT VISIT LOW MDM: CPT

## 2021-03-10 PROCEDURE — 87210 SMEAR WET MOUNT SALINE/INK: CPT | Performed by: EMERGENCY MEDICINE

## 2021-03-10 PROCEDURE — 81000 URINALYSIS NONAUTO W/SCOPE: CPT | Performed by: EMERGENCY MEDICINE

## 2021-03-10 PROCEDURE — 87591 N.GONORRHOEAE DNA AMP PROB: CPT | Performed by: EMERGENCY MEDICINE

## 2021-03-10 PROCEDURE — 87491 CHLMYD TRACH DNA AMP PROBE: CPT | Performed by: EMERGENCY MEDICINE

## 2021-03-10 RX ORDER — AZITHROMYCIN 250 MG/1
1000 TABLET, FILM COATED ORAL
Status: DISCONTINUED | OUTPATIENT
Start: 2021-03-10 | End: 2021-03-10

## 2021-03-10 RX ORDER — ONDANSETRON 4 MG/1
4 TABLET, ORALLY DISINTEGRATING ORAL
Status: DISCONTINUED | OUTPATIENT
Start: 2021-03-10 | End: 2021-03-10

## 2021-03-10 RX ORDER — FLUCONAZOLE 150 MG/1
150 TABLET ORAL DAILY
Qty: 2 TABLET | Refills: 0 | Status: SHIPPED | OUTPATIENT
Start: 2021-03-10 | End: 2021-03-12

## 2021-03-11 LAB
C TRACH DNA SPEC QL NAA+PROBE: NOT DETECTED
N GONORRHOEA DNA SPEC QL NAA+PROBE: NOT DETECTED

## 2021-06-20 ENCOUNTER — HOSPITAL ENCOUNTER (EMERGENCY)
Facility: HOSPITAL | Age: 26
Discharge: HOME | End: 2021-06-20
Attending: EMERGENCY MEDICINE
Payer: COMMERCIAL

## 2021-06-20 VITALS
DIASTOLIC BLOOD PRESSURE: 70 MMHG | HEIGHT: 62 IN | BODY MASS INDEX: 26.68 KG/M2 | WEIGHT: 145 LBS | HEART RATE: 115 BPM | TEMPERATURE: 98 F | RESPIRATION RATE: 17 BRPM | SYSTOLIC BLOOD PRESSURE: 113 MMHG | OXYGEN SATURATION: 99 %
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
## 2021-06-20 PROCEDURE — 99283 EMERGENCY DEPT VISIT LOW MDM: CPT

## 2021-06-20 RX ORDER — AMOXICILLIN AND CLAVULANATE POTASSIUM 875; 125 MG/1; MG/1
1 TABLET, FILM COATED ORAL 2 TIMES DAILY
Qty: 14 TABLET | Refills: 0 | Status: SHIPPED | OUTPATIENT
Start: 2021-06-20 | End: 2021-12-12 | Stop reason: ALTCHOICE

## 2021-12-12 ENCOUNTER — HOSPITAL ENCOUNTER (EMERGENCY)
Facility: HOSPITAL | Age: 26
Discharge: HOME | End: 2021-12-12
Attending: EMERGENCY MEDICINE
Payer: COMMERCIAL

## 2021-12-12 VITALS
WEIGHT: 140 LBS | SYSTOLIC BLOOD PRESSURE: 134 MMHG | HEIGHT: 61 IN | TEMPERATURE: 98 F | HEART RATE: 107 BPM | BODY MASS INDEX: 26.43 KG/M2 | DIASTOLIC BLOOD PRESSURE: 61 MMHG | RESPIRATION RATE: 18 BRPM | OXYGEN SATURATION: 99 %

## 2021-12-12 DIAGNOSIS — B35.3 TINEA PEDIS OF LEFT FOOT: ICD-10-CM

## 2021-12-12 DIAGNOSIS — L03.116 CELLULITIS OF LEFT FOOT: Primary | ICD-10-CM

## 2021-12-12 LAB
B-HCG UR QL: NEGATIVE
POCT GLUCOSE: 92 MG/DL (ref 70–110)

## 2021-12-12 PROCEDURE — 82962 GLUCOSE BLOOD TEST: CPT | Mod: ER

## 2021-12-12 PROCEDURE — 25000003 PHARM REV CODE 250: Mod: ER | Performed by: PHYSICIAN ASSISTANT

## 2021-12-12 PROCEDURE — 81025 URINE PREGNANCY TEST: CPT | Mod: ER | Performed by: PHYSICIAN ASSISTANT

## 2021-12-12 PROCEDURE — 99284 EMERGENCY DEPT VISIT MOD MDM: CPT | Mod: 25,ER

## 2021-12-12 RX ORDER — CLOTRIMAZOLE 1 G/ML
SOLUTION TOPICAL 2 TIMES DAILY
Qty: 15 ML | Refills: 0 | OUTPATIENT
Start: 2021-12-12 | End: 2022-02-02

## 2021-12-12 RX ORDER — CLINDAMYCIN HYDROCHLORIDE 150 MG/1
450 CAPSULE ORAL
Status: COMPLETED | OUTPATIENT
Start: 2021-12-12 | End: 2021-12-12

## 2021-12-12 RX ORDER — CLINDAMYCIN HYDROCHLORIDE 150 MG/1
450 CAPSULE ORAL 3 TIMES DAILY
Qty: 63 CAPSULE | Refills: 0 | Status: SHIPPED | OUTPATIENT
Start: 2021-12-12 | End: 2021-12-19

## 2021-12-12 RX ADMIN — CLINDAMYCIN HYDROCHLORIDE 450 MG: 150 CAPSULE ORAL at 08:12

## 2022-02-02 ENCOUNTER — HOSPITAL ENCOUNTER (EMERGENCY)
Facility: HOSPITAL | Age: 27
Discharge: HOME | End: 2022-02-02
Attending: EMERGENCY MEDICINE

## 2022-02-02 VITALS
HEART RATE: 87 BPM | WEIGHT: 145 LBS | TEMPERATURE: 98 F | DIASTOLIC BLOOD PRESSURE: 71 MMHG | SYSTOLIC BLOOD PRESSURE: 114 MMHG | OXYGEN SATURATION: 99 % | HEIGHT: 62 IN | RESPIRATION RATE: 18 BRPM | BODY MASS INDEX: 26.68 KG/M2

## 2022-02-02 DIAGNOSIS — L03.032 CELLULITIS OF TOE OF LEFT FOOT: Primary | ICD-10-CM

## 2022-02-02 DIAGNOSIS — B35.3 TINEA PEDIS OF LEFT FOOT: ICD-10-CM

## 2022-02-02 LAB — B-HCG UR QL: NEGATIVE

## 2022-02-02 PROCEDURE — 81025 URINE PREGNANCY TEST: CPT | Mod: ER | Performed by: PHYSICIAN ASSISTANT

## 2022-02-02 PROCEDURE — 99284 EMERGENCY DEPT VISIT MOD MDM: CPT | Mod: 25,ER

## 2022-02-02 PROCEDURE — 10060 I&D ABSCESS SIMPLE/SINGLE: CPT | Mod: ER

## 2022-02-02 RX ORDER — FLUCONAZOLE 200 MG/1
200 TABLET ORAL DAILY
Qty: 1 TABLET | Refills: 0 | Status: SHIPPED | OUTPATIENT
Start: 2022-02-02 | End: 2022-02-03

## 2022-02-02 RX ORDER — CLOTRIMAZOLE 1 %
CREAM (GRAM) TOPICAL
Qty: 15 G | Refills: 0 | Status: SHIPPED | OUTPATIENT
Start: 2022-02-02 | End: 2022-06-16 | Stop reason: CLARIF

## 2022-02-02 RX ORDER — SULFAMETHOXAZOLE AND TRIMETHOPRIM 800; 160 MG/1; MG/1
1 TABLET ORAL 2 TIMES DAILY
Qty: 14 TABLET | Refills: 0 | Status: SHIPPED | OUTPATIENT
Start: 2022-02-02 | End: 2022-02-09

## 2022-02-02 RX ORDER — AMOXICILLIN 500 MG/1
CAPSULE ORAL
COMMUNITY
Start: 2022-01-17 | End: 2022-06-16 | Stop reason: CLARIF

## 2022-02-02 RX ORDER — HYDROCODONE BITARTRATE AND ACETAMINOPHEN 5; 325 MG/1; MG/1
1 TABLET ORAL EVERY 6 HOURS PRN
COMMUNITY
Start: 2022-01-19 | End: 2022-06-16 | Stop reason: CLARIF

## 2022-02-02 NOTE — Clinical Note
"Nicola "Dignaisabela Grande was seen and treated in our emergency department on 2/2/2022.  She may return to work on 02/04/2022.       If you have any questions or concerns, please don't hesitate to call.      Inna Matias PA-C"

## 2022-02-02 NOTE — ED PROVIDER NOTES
Encounter Date: 2/2/2022       History     Chief Complaint   Patient presents with    Foot Pain     Pt rpts left foot pain, states foot is swollen and is red. Pt states the pain is in between her toes. Pt states she had cellulitis in December at the bottom of her foot and is not in the top of her foot.      HPI: Nicola Grande, a 26 y.o. female  has no past medical history on file.     She presents to the ED evaluation of redness and itching to left foot area.  Patient states that she was recently diagnosed with cellulitis in early December.  Was prescribed a course of antibiotics as well as antifungal cream.  She completed antibiotics with improvement of the redness to site however she was not able to fill the antifungal cream.  States that her redness completely resolved and then returned 2-3 days ago.  States that she has been mindful of keeping the area dry.        The history is provided by the patient.     Review of patient's allergies indicates:  No Known Allergies  No past medical history on file.  Past Surgical History:   Procedure Laterality Date    CYST REMOVAL       No family history on file.  Social History     Tobacco Use    Smoking status: Never Smoker    Smokeless tobacco: Never Used   Substance Use Topics    Alcohol use: Yes     Comment: occasionally    Drug use: No     Review of Systems   Constitutional: Negative for fever.   Gastrointestinal: Negative for nausea and vomiting.   Musculoskeletal: Positive for arthralgias.   Skin: Positive for rash. Negative for color change.   Allergic/Immunologic: Negative for immunocompromised state.   Neurological: Negative for weakness and numbness.   Psychiatric/Behavioral: Negative for agitation.   All other systems reviewed and are negative.      Physical Exam     Initial Vitals [02/02/22 1045]   BP Pulse Resp Temp SpO2   114/71 87 18 98.3 °F (36.8 °C) 99 %      MAP       --         Physical Exam    Nursing note and vitals reviewed.  Constitutional: She  appears well-developed and well-nourished. She is not diaphoretic. No distress.   HENT:   Head: Normocephalic and atraumatic.   Right Ear: External ear normal.   Left Ear: External ear normal.   Nose: Nose normal.   Eyes: Conjunctivae and EOM are normal.   Neck:   Normal range of motion.  Cardiovascular: Normal rate and regular rhythm.   Pulmonary/Chest: No respiratory distress.   Musculoskeletal:         General: Normal range of motion.      Cervical back: Normal range of motion.        Feet:      Neurological: She is alert and oriented to person, place, and time.   Skin: Abscess (small pocket of pus between 2nd and 3rd digit ) noted. No rash noted. There is erythema (to dorsal aspect of left foot and inbetween toes).   Psychiatric: She has a normal mood and affect. Thought content normal.         ED Course   I & D - Incision and Drainage    Date/Time: 2/2/2022 8:51 PM  Location procedure was performed: [facility] EMERGENCY DEPARTMENT  Performed by: Inna Matias PA-C  Authorized by: Crow Lei MD   Consent Done: Yes  Consent: Verbal consent obtained.  Risks and benefits: risks, benefits and alternatives were discussed  Consent given by: patient  Type: abscess  Body area: lower extremity    Patient sedated: no  Scalpel size: 18G.  Incision type: single straight  Complexity: simple  Drainage: pus  Drainage amount: scant  Wound treatment: incision  Complications: No  Specimens: No  Implants: No        Labs Reviewed   PREGNANCY TEST, URINE RAPID    Narrative:     Specimen Source->Urine          Imaging Results    None          Medications - No data to display  Medical Decision Making:   Initial Assessment:   Erythema to foot  Differential Diagnosis:   Athlete's foot, cellulitis, abscess, folliculitis   ED Management:  Pt presents to ED for evaluation of redness to foot.  Concerning for athlete's foot infection with small pus filled blister b/t 2nd and 3rd toes.  Pt was given information on care of feet at home.   Given RX for ABX and Lotrimin cream.  She was encouraged to inspect her feet several times a day and to return with any new or worsening symptoms.  Pt verbalized understanding and agreement with plan.                        Clinical Impression:   Final diagnoses:  [L03.032] Cellulitis of toe of left foot (Primary)  [B35.3] Tinea pedis of left foot          ED Disposition Condition    Discharge Stable        ED Prescriptions     Medication Sig Dispense Start Date End Date Auth. Provider    sulfamethoxazole-trimethoprim 800-160mg (BACTRIM DS) 800-160 mg Tab Take 1 tablet by mouth 2 (two) times daily. for 7 days 14 tablet 2/2/2022 2/9/2022 Inna Matias PA-C    clotrimazole (LOTRIMIN) 1 % cream Apply to affected area 2 times daily 15 g 2/2/2022  Inna Matias PA-C    fluconazole (DIFLUCAN) 200 MG Tab Take 1 tablet (200 mg total) by mouth once daily. for 1 day 1 tablet 2/2/2022 2/3/2022 Inna Matias PA-C        Follow-up Information    None          Inna Matias PA-C  02/02/22 2054

## 2022-06-16 ENCOUNTER — HOSPITAL ENCOUNTER (EMERGENCY)
Facility: HOSPITAL | Age: 27
Discharge: HOME | End: 2022-06-16
Attending: EMERGENCY MEDICINE

## 2022-06-16 VITALS
RESPIRATION RATE: 14 BRPM | SYSTOLIC BLOOD PRESSURE: 118 MMHG | HEART RATE: 78 BPM | BODY MASS INDEX: 25.61 KG/M2 | WEIGHT: 140 LBS | TEMPERATURE: 99 F | OXYGEN SATURATION: 98 % | DIASTOLIC BLOOD PRESSURE: 80 MMHG

## 2022-06-16 DIAGNOSIS — J06.9 VIRAL URI WITH COUGH: Primary | ICD-10-CM

## 2022-06-16 LAB
B-HCG UR QL: NEGATIVE
GROUP A STREP, MOLECULAR: NEGATIVE
INFLUENZA A, MOLECULAR: NEGATIVE
INFLUENZA B, MOLECULAR: NEGATIVE
SARS-COV-2 RDRP RESP QL NAA+PROBE: NEGATIVE
SPECIMEN SOURCE: NORMAL

## 2022-06-16 PROCEDURE — 87502 INFLUENZA DNA AMP PROBE: CPT | Mod: ER

## 2022-06-16 PROCEDURE — 87651 STREP A DNA AMP PROBE: CPT | Mod: ER | Performed by: PHYSICIAN ASSISTANT

## 2022-06-16 PROCEDURE — 81025 URINE PREGNANCY TEST: CPT | Mod: ER | Performed by: PHYSICIAN ASSISTANT

## 2022-06-16 PROCEDURE — 25000003 PHARM REV CODE 250: Mod: ER | Performed by: PHYSICIAN ASSISTANT

## 2022-06-16 PROCEDURE — 87502 INFLUENZA DNA AMP PROBE: CPT | Mod: ER | Performed by: EMERGENCY MEDICINE

## 2022-06-16 PROCEDURE — U0002 COVID-19 LAB TEST NON-CDC: HCPCS | Mod: ER | Performed by: PHYSICIAN ASSISTANT

## 2022-06-16 PROCEDURE — 99283 EMERGENCY DEPT VISIT LOW MDM: CPT | Mod: 25,ER

## 2022-06-16 RX ORDER — NAPROXEN 250 MG/1
500 TABLET ORAL
Status: COMPLETED | OUTPATIENT
Start: 2022-06-16 | End: 2022-06-16

## 2022-06-16 RX ADMIN — NAPROXEN 500 MG: 250 TABLET ORAL at 03:06

## 2022-06-16 NOTE — Clinical Note
"Nicola Pandya" Christiane was seen and treated in our emergency department on 6/16/2022.  She may return to work on 06/17/2022.       If you have any questions or concerns, please don't hesitate to call.      darby daniels RN    "

## 2022-06-16 NOTE — DISCHARGE INSTRUCTIONS
Your COVID test, flu test and strep test were all negative.  Your advised to rest and drink plenty of clear fluid.  Your advised alternate Tylenol Motrin as needed for body aches or fever.  You are instructed to return to the emergency department immediately for any new or worsening symptoms.

## 2022-06-16 NOTE — ED PROVIDER NOTES
Encounter Date: 6/16/2022       History     Chief Complaint   Patient presents with    Sore Throat     I started with a sore throat 4 days ago and just feeling weak. I also felt warm.      26-year-old female presents emergency department for evaluation of 4 day history of pharyngitis, generalized body aches, nasal congestion and rhinorrhea.  She reports that the symptoms began gradually four days ago and have been intermittent since onset.  She denies any fever, headache, dizziness, vision changes, neck pain, chest pain, shortness of breath, nausea, vomiting, flank pain or dysuria.  She denies any known sick contacts.  No treatment was attempted at home or prior to arrival today.        Review of patient's allergies indicates:  No Known Allergies  History reviewed. No pertinent past medical history.  Past Surgical History:   Procedure Laterality Date    CYST REMOVAL       History reviewed. No pertinent family history.  Social History     Tobacco Use    Smoking status: Never Smoker    Smokeless tobacco: Never Used   Substance Use Topics    Alcohol use: Yes     Comment: occasionally    Drug use: No     Review of Systems   Constitutional: Negative for activity change, appetite change and fever.   HENT: Positive for congestion, rhinorrhea and sore throat. Negative for ear discharge, ear pain, trouble swallowing and voice change.    Eyes: Negative for photophobia and visual disturbance.   Respiratory: Negative for cough, chest tightness and shortness of breath.    Cardiovascular: Negative for chest pain, palpitations and leg swelling.   Gastrointestinal: Negative for abdominal pain, constipation, diarrhea, nausea and vomiting.   Genitourinary: Negative for decreased urine volume and dysuria.   Musculoskeletal: Negative for back pain, joint swelling and neck pain.   Skin: Negative for rash.   Neurological: Negative for dizziness, syncope, weakness, light-headedness, numbness and headaches.       Physical Exam      Initial Vitals [06/16/22 1338]   BP Pulse Resp Temp SpO2   118/80 99 15 98.4 °F (36.9 °C) 98 %      MAP       --         Physical Exam    Nursing note and vitals reviewed.  Constitutional: She appears well-developed and well-nourished. She is not diaphoretic. No distress.   HENT:   Head: Normocephalic and atraumatic.   Right Ear: Tympanic membrane, external ear and ear canal normal.   Left Ear: Tympanic membrane, external ear and ear canal normal.   Nose: Nose normal.   Mouth/Throat: Oropharynx is clear and moist.   Eyes: Conjunctivae and EOM are normal. Pupils are equal, round, and reactive to light.   Neck: Neck supple.   Normal range of motion.  Cardiovascular: Normal rate, regular rhythm and normal heart sounds.   Pulmonary/Chest: Breath sounds normal. No respiratory distress. She has no wheezes. She has no rhonchi. She has no rales. She exhibits no tenderness.   Abdominal: Abdomen is soft. Bowel sounds are normal. She exhibits no distension. There is no abdominal tenderness. There is no rebound.   Musculoskeletal:      Cervical back: Normal range of motion and neck supple.     Lymphadenopathy:     She has no cervical adenopathy.   Neurological: She is alert and oriented to person, place, and time.   Skin: Skin is warm and dry.   Psychiatric: She has a normal mood and affect.         ED Course   Procedures  Labs Reviewed   GROUP A STREP, MOLECULAR   INFLUENZA A & B BY MOLECULAR   SARS-COV-2 RNA AMPLIFICATION, QUAL    Narrative:     Is the patient symptomatic?->Yes   PREGNANCY TEST, URINE RAPID    Narrative:     Specimen Source->Urine          Imaging Results    None          Medications   naproxen tablet 500 mg (500 mg Oral Given 6/16/22 1503)     Medical Decision Making:   Initial Assessment:   26-year-old female presents emergency department for evaluation of 4 day history of nasal congestion, for rhinorrhea, pharyngitis and generalized body aches.  Physical exam reveals a nontoxic-appearing female in no  acute distress.  Patient is afebrile and vital signs within normal limits.  Neurological exam reveals alert and oriented patient.  TMs reveal no erythema.  Posterior pharynx reveals no erythema, edema or tonsillar exudate.  No uvular edema or deviation noted.  No trismus, stridor drooling noted.  Neck is supple, no meningeal signs noted.  Lungs clear to auscultation bilaterally.  Differential Diagnosis:   COVID-19  Streptococcal pharyngitis  Viral URI  Influenza  Pregnancy  ED Management:  UPT negative.  Patient was given Naprosyn for symptom control.  Influenza negative.  Rapid COVID negative.  Group a strep negative.  Patient was given Naprosyn with relief of symptoms.  Discussed these findings at length with the patient verbalizes understanding and agreement course of treatment.  Instructed patient to rest is, stay home and drink lots of fluid.  Instructed patient to return to the emergency department immediately for any new or worsening symptoms.                      Clinical Impression:   Final diagnoses:  [J06.9] Viral URI with cough (Primary)          ED Disposition Condition    Discharge Stable        ED Prescriptions     None        Follow-up Information    None          Marisa Soares PA-C  06/16/22 3660

## 2022-06-16 NOTE — FIRST PROVIDER EVALUATION
Emergency Department TeleTriage Encounter Note      CHIEF COMPLAINT    Chief Complaint   Patient presents with    Sore Throat     I started with a sore throat 4 days ago and just feeling weak. I also felt warm.        VITAL SIGNS   Initial Vitals [06/16/22 1338]   BP Pulse Resp Temp SpO2   118/80 99 15 98.4 °F (36.9 °C) 98 %      MAP       --            ALLERGIES    Review of patient's allergies indicates:  No Known Allergies    PROVIDER TRIAGE NOTE  This is a teletriage evaluation of a 26 y.o. female presenting to the ED with c/o several days nasal congestion, left ear fullness. No fever, voice change, cough     PE: sounds congested. Non-toxic/well-appearing. No respiratory distress, speaks in full sentences without issue. No active emesis nor cough. Normal eye contact and mentation.     Plan: viral swabs. Further/augmented workup at discretion of examining provider.     All ED beds are full at present; patient notified of this status.  Patient seen and medically screened by [facility] via teletriage. Orders initiated at triage to expedite care.  Patient is stable and will be placed in an ED bed when available.  Care will be transferred to an alternate provider when patient has been placed in an Exam Room further exam, additional orders, and disposition.         ORDERS  Labs Reviewed   THROAT SCREEN, RAPID STREP   SARS-COV-2 RNA AMPLIFICATION, QUAL       ED Orders (720h ago, onward)    Start Ordered     Status Ordering Provider    06/16/22 1344 06/16/22 1343  COVID-19 Rapid Screening  STAT         Ordered CARLEEN CARRIZALES    06/16/22 1344 06/16/22 1343  Rapid strep screen  STAT         Ordered CARLEEN CARRIZALES            Virtual Visit Note: The provider triage portion of this emergency department evaluation and documentation was performed via TUBE, a HIPAA-compliant telemedicine application, in concert with a tele-presenter in the room. A face to face patient evaluation with one of my colleagues will occur  once the patient is placed in an emergency department room.      DISCLAIMER: This note was prepared with fitaborate voice recognition transcription software. Garbled syntax, mangled pronouns, and other bizarre constructions may be attributed to that software system.

## 2022-11-01 ENCOUNTER — HOSPITAL ENCOUNTER (EMERGENCY)
Facility: HOSPITAL | Age: 27
Discharge: HOME | End: 2022-11-01
Attending: EMERGENCY MEDICINE

## 2022-11-01 VITALS
OXYGEN SATURATION: 99 % | DIASTOLIC BLOOD PRESSURE: 71 MMHG | HEART RATE: 86 BPM | RESPIRATION RATE: 19 BRPM | BODY MASS INDEX: 28.52 KG/M2 | HEIGHT: 62 IN | TEMPERATURE: 99 F | SYSTOLIC BLOOD PRESSURE: 116 MMHG | WEIGHT: 155 LBS

## 2022-11-01 DIAGNOSIS — M79.18 MUSCLE ACHE OF EXTREMITY: Primary | ICD-10-CM

## 2022-11-01 PROCEDURE — 99283 EMERGENCY DEPT VISIT LOW MDM: CPT | Mod: ER

## 2022-11-01 RX ORDER — METHOCARBAMOL 500 MG/1
1000 TABLET, FILM COATED ORAL 4 TIMES DAILY PRN
Qty: 30 TABLET | Refills: 0 | Status: SHIPPED | OUTPATIENT
Start: 2022-11-01 | End: 2024-03-18

## 2022-11-01 NOTE — ED PROVIDER NOTES
"Encounter Date: 11/1/2022       History     Chief Complaint   Patient presents with    Leg Pain     Pt C/O RLE pain X 1 week PTA.  Pt reports "I don't know what I was doing when it started hurting." No obvious injury noted.      The patient is a 26-year-old who has no chronic medical problems and presents with pain to her right buttock and posterior thigh that began six days ago.  The pain is sharp and worsened by movement and bending over.  She denies trauma but went zip lining and ATV riding two days prior to the onset of the pain.  She has taken acetaminophen twice and her grandmother gave her a dose of a muscle relaxant yesterday.  Her pain has waxing and waning intensity.  She is concerned that it has not resolved.  She denies back pain.  She denies abdominal pain.  She has no urinary symptoms.  She has not noticed rashes or lesions.  She has no history of prior trauma to the right lower extremity and has had no surgeries to the extremity.  She takes birth control and is adamant that she is not pregnant.    The history is provided by the patient. No  was used.   Review of patient's allergies indicates:  No Known Allergies  History reviewed. No pertinent past medical history.  Past Surgical History:   Procedure Laterality Date    CYST REMOVAL       History reviewed. No pertinent family history.  Social History     Tobacco Use    Smoking status: Never    Smokeless tobacco: Never   Substance Use Topics    Alcohol use: Yes     Comment: occasionally    Drug use: No     Review of Systems   Constitutional:  Negative for fever.   Genitourinary:  Negative for difficulty urinating and dysuria.   Musculoskeletal:  Negative for back pain.        + right buttock and posterior thigh pain   Skin:  Negative for rash.   Neurological:  Negative for weakness and numbness.     Physical Exam     Initial Vitals [11/01/22 0811]   BP Pulse Resp Temp SpO2   116/71 86 19 98.5 °F (36.9 °C) 99 %      MAP       --     "     Physical Exam    Nursing note and vitals reviewed.  Constitutional: She is not diaphoretic. No distress.   Cardiovascular:            Pulses:       Dorsalis pedis pulses are 2+ on the right side.        Posterior tibial pulses are 2+ on the right side.   Musculoskeletal:      Thoracic back: No swelling, deformity, spasms or tenderness.      Lumbar back: No swelling, deformity, spasms or tenderness.      Comments: Right thigh:  No swelling.  No tenderness.  Pain improved with palpitation of the posterior thigh.    Right calf:  No swelling or tenderness.  Negative Homans sign.     Neurological: She is alert and oriented to person, place, and time. GCS eye subscore is 4. GCS verbal subscore is 5. GCS motor subscore is 6.   5/5 strength and normal sensation to touch throughout the right lower extremity.   Skin: Skin is warm and dry. No pallor.       ED Course   Procedures  Labs Reviewed - No data to display       Imaging Results    None          Medications - No data to display                           Clinical Impression:   Final diagnoses:  [M79.18] Muscle ache of extremity (Primary)      ED Disposition Condition    Discharge Stable          ED Prescriptions       Medication Sig Dispense Start Date End Date Auth. Provider    methocarbamoL (ROBAXIN) 500 MG Tab Take 2 tablets (1,000 mg total) by mouth 4 (four) times daily as needed (Right thigh pain). 30 tablet 11/1/2022 -- Alfredo Andres III, MD          Follow-up Information       Follow up With Specialties Details Why Contact Info    Your primary care provider   We recommend that you arrange follow up with your primary care provider within the next few days.     ER   Return to this ER or visit any other ER should you have any concerns that you feel need immediate attention.              Alfredo Andres III, MD  11/01/22 8785

## 2022-12-15 ENCOUNTER — HOSPITAL ENCOUNTER (EMERGENCY)
Facility: HOSPITAL | Age: 27
Discharge: HOME | End: 2022-12-15

## 2022-12-15 VITALS
HEIGHT: 62 IN | DIASTOLIC BLOOD PRESSURE: 56 MMHG | OXYGEN SATURATION: 98 % | TEMPERATURE: 99 F | WEIGHT: 150 LBS | HEART RATE: 111 BPM | RESPIRATION RATE: 20 BRPM | BODY MASS INDEX: 27.6 KG/M2 | SYSTOLIC BLOOD PRESSURE: 122 MMHG

## 2022-12-15 DIAGNOSIS — J02.9 SORE THROAT: ICD-10-CM

## 2022-12-15 DIAGNOSIS — R52 GENERALIZED BODY ACHES: ICD-10-CM

## 2022-12-15 DIAGNOSIS — J10.1 INFLUENZA A: Primary | ICD-10-CM

## 2022-12-15 LAB
GROUP A STREP, MOLECULAR: NEGATIVE
INFLUENZA A, MOLECULAR: POSITIVE
INFLUENZA B, MOLECULAR: NEGATIVE
SARS-COV-2 RDRP RESP QL NAA+PROBE: NEGATIVE
SPECIMEN SOURCE: ABNORMAL

## 2022-12-15 PROCEDURE — 87651 STREP A DNA AMP PROBE: CPT | Mod: ER | Performed by: PHYSICIAN ASSISTANT

## 2022-12-15 PROCEDURE — 99283 EMERGENCY DEPT VISIT LOW MDM: CPT | Mod: ER

## 2022-12-15 PROCEDURE — 87502 INFLUENZA DNA AMP PROBE: CPT | Mod: ER | Performed by: PHYSICIAN ASSISTANT

## 2022-12-15 PROCEDURE — 25000003 PHARM REV CODE 250: Mod: ER | Performed by: PHYSICIAN ASSISTANT

## 2022-12-15 PROCEDURE — U0002 COVID-19 LAB TEST NON-CDC: HCPCS | Mod: ER | Performed by: PHYSICIAN ASSISTANT

## 2022-12-15 RX ORDER — OSELTAMIVIR PHOSPHATE 75 MG/1
75 CAPSULE ORAL 2 TIMES DAILY
Qty: 10 CAPSULE | Refills: 0 | Status: SHIPPED | OUTPATIENT
Start: 2022-12-15 | End: 2022-12-20

## 2022-12-15 RX ORDER — ACETAMINOPHEN 500 MG
500 TABLET ORAL
Status: COMPLETED | OUTPATIENT
Start: 2022-12-15 | End: 2022-12-15

## 2022-12-15 RX ADMIN — ACETAMINOPHEN 500 MG: 500 TABLET ORAL at 12:12

## 2022-12-15 NOTE — ED PROVIDER NOTES
Encounter Date: 12/15/2022       History     Chief Complaint   Patient presents with    Fever     Sore throat, nasal congestin, cough, body aches, chills and fever that started tuesday     27-year-old female presenting to ED with complaints of generalized body aches, sweats, chills, sore throat, nasal congestion which onset 2 days ago persistent and progressively worsening.  No OTC meds taken to assist.  No known recent sick contacts.  No chest pain, shortness of breath, abdominal pain, difficulty tolerating secretions or any other physical complaints at this time.    The history is provided by the patient. No  was used.   Review of patient's allergies indicates:  No Known Allergies  History reviewed. No pertinent past medical history.  Past Surgical History:   Procedure Laterality Date    CYST REMOVAL       History reviewed. No pertinent family history.  Social History     Tobacco Use    Smoking status: Never    Smokeless tobacco: Never   Substance Use Topics    Alcohol use: Yes     Comment: occasionally    Drug use: No     Review of Systems   Constitutional:  Positive for chills, diaphoresis, fatigue and fever.   HENT:  Positive for congestion and sore throat. Negative for ear pain, sinus pain and trouble swallowing.    Eyes:  Negative for photophobia, pain, redness and visual disturbance.   Respiratory:  Positive for cough. Negative for choking, chest tightness, shortness of breath, wheezing and stridor.    Cardiovascular:  Negative for chest pain, palpitations and leg swelling.   Gastrointestinal:  Negative for abdominal pain, diarrhea, nausea and vomiting.   Endocrine: Negative.    Genitourinary:  Negative for dysuria, flank pain and hematuria.   Musculoskeletal:  Negative for back pain, myalgias and neck pain.   Skin: Negative.    Allergic/Immunologic: Negative.    Neurological:  Negative for dizziness, tremors, seizures, weakness, light-headedness, numbness and headaches.   Hematological:  Negative.    Psychiatric/Behavioral: Negative.     All other systems reviewed and are negative.    Physical Exam     Initial Vitals [12/15/22 1147]   BP Pulse Resp Temp SpO2   (!) 122/56 (!) 111 20 99.2 °F (37.3 °C) 98 %      MAP       --         Physical Exam    Nursing note and vitals reviewed.  Constitutional: Vital signs are normal. She appears well-developed and well-nourished. She is not diaphoretic. No distress.   27-year-old female sitting upright in no acute distress, nontoxic, alert and oriented, breathing comfortably on room air, normal vocalization   HENT:   Head: Normocephalic and atraumatic.   Right Ear: Hearing and external ear normal.   Left Ear: Hearing and external ear normal.   Nose: Rhinorrhea present.   Mouth/Throat: Uvula is midline and oropharynx is clear and moist.   Eyes: Conjunctivae and EOM are normal. Pupils are equal, round, and reactive to light.   Neck: Trachea normal. Neck supple.   No meningeal signs   Normal range of motion.  Cardiovascular:  Normal rate, regular rhythm, normal heart sounds and normal pulses.           Pulmonary/Chest: Effort normal and breath sounds normal. No accessory muscle usage or stridor. No tachypnea. No respiratory distress. She has no decreased breath sounds. She has no wheezes.   Abdominal: There is no rigidity.   Musculoskeletal:         General: Normal range of motion.      Cervical back: Normal range of motion and neck supple.     Neurological: She is alert and oriented to person, place, and time. She has normal strength. She displays no tremor. She exhibits normal muscle tone. She displays no seizure activity. Coordination normal. GCS score is 15. GCS eye subscore is 4. GCS verbal subscore is 5. GCS motor subscore is 6.   Skin: Skin is warm and dry. Capillary refill takes less than 2 seconds.       ED Course   Procedures  Labs Reviewed   INFLUENZA A & B BY MOLECULAR - Abnormal; Notable for the following components:       Result Value    Influenza A,  Molecular Positive (*)     All other components within normal limits   GROUP A STREP, MOLECULAR   SARS-COV-2 RNA AMPLIFICATION, QUAL    Narrative:     Is the patient symptomatic?->Yes          Imaging Results    None          Medications   acetaminophen tablet 500 mg (500 mg Oral Given 12/15/22 1213)                 ED Course as of 12/15/22 1301   Thu Dec 15, 2022   1257 Influenza A, Molecular(!): Positive [MC]      ED Course User Index  [MC] Dary Soria PA-C                 Clinical Impression:   Final diagnoses:  [R52] Generalized body aches  [J02.9] Sore throat  [J10.1] Influenza A (Primary)        ED Disposition Condition    Discharge Stable          ED Prescriptions       Medication Sig Dispense Start Date End Date Auth. Provider    oseltamivir (TAMIFLU) 75 MG capsule Take 1 capsule (75 mg total) by mouth 2 (two) times daily. for 5 days 10 capsule 12/15/2022 12/20/2022 Dary Soria PA-C          Follow-up Information       Follow up With Specialties Details Why Contact Info    PRIMARY CARE MD  Schedule an appointment as soon as possible for a visit in 3 days If symptoms worsen     [facility] - Emergency Dept Emergency Medicine Go to  If symptoms worsen [address] [employer] [facility] [address] [phone]          PATIENT SEEN BY TRENT ONLY.    Discussed care plan with patient.  Discussed positive flu testing, patient is still within the 1st 48-72 hours, did prescribe Tamiflu to assist in educated on symptomatic management, close follow-up with PCP as well as ED return precautions.  Patient is stable, no meningeal signs, all questions answered and ready for discharge.     Dary Soria PA-C  12/15/22 1301

## 2022-12-15 NOTE — DISCHARGE INSTRUCTIONS
Alternate Tylenol and ibuprofen every 6 hours, maintain adequate hydration and rest, follow up closely with PCP.  Take prescribed medication as directed as needed to assist with symptoms.

## 2023-02-25 ENCOUNTER — HOSPITAL ENCOUNTER (EMERGENCY)
Facility: HOSPITAL | Age: 28
Discharge: HOME | End: 2023-02-25
Attending: EMERGENCY MEDICINE
Payer: COMMERCIAL

## 2023-02-25 DIAGNOSIS — J02.9 VIRAL PHARYNGITIS: Primary | ICD-10-CM

## 2023-02-25 LAB
B-HCG UR QL: NEGATIVE
CTP QC/QA: YES
GROUP A STREP, MOLECULAR: NEGATIVE
INFLUENZA A, MOLECULAR: NEGATIVE
INFLUENZA B, MOLECULAR: NEGATIVE
SARS-COV-2 RDRP RESP QL NAA+PROBE: NEGATIVE
SPECIMEN SOURCE: NORMAL

## 2023-02-25 PROCEDURE — 87651 STREP A DNA AMP PROBE: CPT | Mod: ER | Performed by: EMERGENCY MEDICINE

## 2023-02-25 PROCEDURE — 63600175 PHARM REV CODE 636 W HCPCS: Mod: ER | Performed by: EMERGENCY MEDICINE

## 2023-02-25 PROCEDURE — 81025 URINE PREGNANCY TEST: CPT | Mod: ER | Performed by: EMERGENCY MEDICINE

## 2023-02-25 PROCEDURE — U0002 COVID-19 LAB TEST NON-CDC: HCPCS | Mod: ER | Performed by: EMERGENCY MEDICINE

## 2023-02-25 PROCEDURE — 99284 EMERGENCY DEPT VISIT MOD MDM: CPT | Mod: ER

## 2023-02-25 PROCEDURE — 96372 THER/PROPH/DIAG INJ SC/IM: CPT | Performed by: EMERGENCY MEDICINE

## 2023-02-25 PROCEDURE — 87502 INFLUENZA DNA AMP PROBE: CPT | Mod: ER | Performed by: EMERGENCY MEDICINE

## 2023-02-25 RX ORDER — DICLOFENAC SODIUM 75 MG/1
75 TABLET, DELAYED RELEASE ORAL 2 TIMES DAILY
Qty: 60 TABLET | Refills: 0 | Status: SHIPPED | OUTPATIENT
Start: 2023-02-25 | End: 2023-06-01 | Stop reason: ALTCHOICE

## 2023-02-25 RX ORDER — KETOROLAC TROMETHAMINE 30 MG/ML
15 INJECTION, SOLUTION INTRAMUSCULAR; INTRAVENOUS
Status: COMPLETED | OUTPATIENT
Start: 2023-02-25 | End: 2023-02-25

## 2023-02-25 RX ADMIN — KETOROLAC TROMETHAMINE 15 MG: 30 INJECTION, SOLUTION INTRAMUSCULAR; INTRAVENOUS at 10:02

## 2023-02-26 VITALS
RESPIRATION RATE: 18 BRPM | HEIGHT: 61 IN | DIASTOLIC BLOOD PRESSURE: 80 MMHG | SYSTOLIC BLOOD PRESSURE: 120 MMHG | WEIGHT: 150 LBS | OXYGEN SATURATION: 99 % | HEART RATE: 67 BPM | TEMPERATURE: 98 F | BODY MASS INDEX: 28.32 KG/M2

## 2023-02-26 NOTE — ED PROVIDER NOTES
Encounter Date: 2/25/2023       History     Chief Complaint   Patient presents with    Sore Throat     Pt to the Er with pain in throat she noticed last night. Pt reports using a throat spray with no relief. Unknown fever.      HPI  27 y.o.   ST x 1 day  Paucity of other uri sx  States around people coughnig  Able to breathe, speak, swallow    Review of patient's allergies indicates:  No Known Allergies  History reviewed. No pertinent past medical history.  Past Surgical History:   Procedure Laterality Date    CYST REMOVAL       History reviewed. No pertinent family history.  Social History     Tobacco Use    Smoking status: Never    Smokeless tobacco: Never   Substance Use Topics    Alcohol use: Yes     Comment: occasionally    Drug use: No     Review of Systems  All systems were reviewed/examined and were negative except as noted in the HPI.    Physical Exam     Initial Vitals [02/25/23 2154]   BP Pulse Resp Temp SpO2   128/86 71 16 98.4 °F (36.9 °C) 97 %      MAP       --         Physical Exam    General: the patient is awake, alert, and in no apparent distress.  Head: normocephalic and atraumatic, sclera are clear  OP no sig exudate, uvula midline  Nl voice no stridor  Neck: supple without meningismus  Chest:  no respiratory distress  Heart: regular rate and rhythm  Extremities: warm and well perfused  Skin: warm and dry  Psych conversant  Neuro: awake, alert, moving all extremities    ED Course   Procedures  Labs Reviewed   INFLUENZA A & B BY MOLECULAR   GROUP A STREP, MOLECULAR   SARS-COV-2 RNA AMPLIFICATION, QUAL    Narrative:     Is the patient symptomatic?->Yes   POCT URINE PREGNANCY          Imaging Results    None          Medications   ketorolac injection 15 mg (15 mg Intramuscular Given 2/25/23 2250)        Medical Decision Making:    This is an emergent evaluation of a patient presenting to the ED.  Nursing notes were reviewed.  Swabs neg  I personally reviewed and interpreted the laboratory  results.    I decided to obtain and review old medical records, which showed: ED visits    Evaluation for Emergency Medical Condition  The patient received a medical screening exam and within a reasonable degree of clinical confidence an emergency medical condition has not been identified.  The patient is instructed on proper follow up and return precautions to the ED.    The patient was encouraged strongly to get the COVID-19 vaccine either after asymptomatic (if COVID positive) or offered it here in the ED is COVID negative.  The patient was also encouraged to obtain an influenza vaccination if available once asymptomatic (if positive) or if testing negative in the ED.      Best Brady MD, JAROD                          Clinical Impression:   Final diagnoses:  [J02.9] Viral pharyngitis (Primary)        ED Disposition Condition    Discharge Stable          ED Prescriptions       Medication Sig Dispense Start Date End Date Auth. Provider    diclofenac (VOLTAREN) 75 MG EC tablet Take 1 tablet (75 mg total) by mouth 2 (two) times daily. 60 tablet 2/25/2023 -- Crow Brady MD    diphenhydrAMINE-aluminum-magnesium hydroxide-simethicone-LIDOcaine HCl 2% Swish and spit 15 mLs every 4 (four) hours as needed. 200 each 2/25/2023 -- Crow Brady MD          Follow-up Information       Follow up With Specialties Details Why Contact Info Additional Information    [facility] - Internal Medicine Internal Medicine Schedule an appointment as soon as possible for a visit   [address], [address] [address] [phone] Please park in surface lot and check in at [address].          Discharged to home in stable condition, return to ED warnings given, follow up and patient care instructions given.      Best Brady MD, JAROD, [facility]  Department of Emergency Medicine       Crow Brady MD  02/27/23 8490

## 2023-06-01 ENCOUNTER — HOSPITAL ENCOUNTER (EMERGENCY)
Facility: HOSPITAL | Age: 28
Discharge: HOME | End: 2023-06-01
Attending: EMERGENCY MEDICINE
Payer: COMMERCIAL

## 2023-06-01 VITALS
DIASTOLIC BLOOD PRESSURE: 72 MMHG | HEART RATE: 84 BPM | RESPIRATION RATE: 18 BRPM | WEIGHT: 150 LBS | HEIGHT: 61 IN | SYSTOLIC BLOOD PRESSURE: 118 MMHG | OXYGEN SATURATION: 99 % | TEMPERATURE: 99 F | BODY MASS INDEX: 28.32 KG/M2

## 2023-06-01 DIAGNOSIS — N64.4 BREAST PAIN: Primary | ICD-10-CM

## 2023-06-01 DIAGNOSIS — N64.9 LESION OF LEFT NIPPLE: ICD-10-CM

## 2023-06-01 LAB
B-HCG UR QL: NEGATIVE
CTP QC/QA: YES

## 2023-06-01 PROCEDURE — 93010 EKG 12-LEAD: ICD-10-PCS | Mod: ,,, | Performed by: INTERNAL MEDICINE

## 2023-06-01 PROCEDURE — 93005 ELECTROCARDIOGRAM TRACING: CPT | Mod: ER

## 2023-06-01 PROCEDURE — 99284 EMERGENCY DEPT VISIT MOD MDM: CPT | Mod: 25,ER

## 2023-06-01 PROCEDURE — 99900035 HC TECH TIME PER 15 MIN (STAT): Mod: ER

## 2023-06-01 PROCEDURE — 93010 ELECTROCARDIOGRAM REPORT: CPT | Mod: ,,, | Performed by: INTERNAL MEDICINE

## 2023-06-01 PROCEDURE — 81025 URINE PREGNANCY TEST: CPT | Mod: ER | Performed by: EMERGENCY MEDICINE

## 2023-06-01 RX ORDER — ORPHENADRINE CITRATE 100 MG/1
100 TABLET, EXTENDED RELEASE ORAL 2 TIMES DAILY
Qty: 10 TABLET | Refills: 0 | Status: SHIPPED | OUTPATIENT
Start: 2023-06-01 | End: 2024-03-18

## 2023-06-01 RX ORDER — NAPROXEN 500 MG/1
500 TABLET ORAL 2 TIMES DAILY WITH MEALS
Qty: 10 TABLET | Refills: 0 | Status: SHIPPED | OUTPATIENT
Start: 2023-06-01 | End: 2024-03-18

## 2023-06-01 NOTE — ED PROVIDER NOTES
Encounter Date: 6/1/2023       History     Chief Complaint   Patient presents with    Breast Pain     Patient reports a sharp pain to lateral right breast that began yesterday.      Patient presents with pain to her right breast for the past 2 days.  She states that she has had on and off pain of her breast in the past.  She denies any trauma.  Denies any other acute complaints.  Of note she also states that she has a nipple lesion to her left nipple.  She states that in January her primary care was for both to refer her to somebody to have the lesion removed.    Review of patient's allergies indicates:  No Known Allergies  No past medical history on file.  Past Surgical History:   Procedure Laterality Date    CYST REMOVAL       No family history on file.  Social History     Tobacco Use    Smoking status: Never    Smokeless tobacco: Never   Substance Use Topics    Alcohol use: Yes     Comment: occasionally    Drug use: No     Review of Systems   Musculoskeletal:         Breast pain     Physical Exam     Initial Vitals [06/01/23 1306]   BP Pulse Resp Temp SpO2   120/84 90 18 98.5 °F (36.9 °C) 98 %      MAP       --         Physical Exam    Vitals reviewed.  Constitutional: She appears well-developed and well-nourished.   Cardiovascular:  Normal rate and regular rhythm.           No murmur heard.  Pulmonary/Chest: Breath sounds normal. She has no wheezes.   Abdominal: Abdomen is soft. There is no abdominal tenderness.   Musculoskeletal:      Comments: No ttp to right breast no abnormal lumps or lesions palpated, exam done with RN chaperone.     Skin:   There is a lesion noted on pt's left nipple.       ED Course   Procedures  Labs Reviewed   POCT URINE PREGNANCY     EKG Readings: (Independently Interpreted)   Sinus rhythm rate of 78, right axis deviation, no ST or T-wave abnormalities interpreted by me   ECG Results              EKG 12-lead (Final result)  Result time 06/01/23 17:16:05      Final result by Interface,  Lab In [facility] (06/01/23 17:16:05)                   Narrative:    Test Reason : N64.4,    Vent. Rate : 078 BPM     Atrial Rate : 078 BPM     P-R Int : 128 ms          QRS Dur : 066 ms      QT Int : 380 ms       P-R-T Axes : 060 059 035 degrees     QTc Int : 433 ms    Normal sinus rhythm  Normal ECG  When compared with ECG of 08-OCT-2020 08:59,  No significant change was found  Confirmed by Von Bryan MD ([id number]) on 6/1/2023 5:15:58 PM    Referred By: BERNADETTE   SELF           Confirmed By:Von Bryan MD                                  Imaging Results              X-Ray Chest PA And Lateral (Final result)  Result time 06/01/23 14:26:24      Final result by Vazquez Benton MD (06/01/23 14:26:24)                   Impression:      No acute process seen.      Electronically signed by: Vazquez Benton MD  Date:    06/01/2023  Time:    14:26               Narrative:    EXAMINATION:  XR CHEST PA AND LATERAL    CLINICAL HISTORY:  Mastodynia    COMPARISON:  10/08/2020    FINDINGS:  Cardiac silhouette is normal.  The lungs demonstrate no evidence of active disease.  No evidence of pleural effusion or pneumothorax.  Bones appear intact.                                       Medications - No data to display  Medical Decision Making:   ED Management:  Chest x-ray reviewed shows no acute process.  An ambulatory referral to breast surgery was placed to evaluate nipple lesion, otherwise she has good follow-up with [facility].  Instructed patient to return immediately for any new or worsening symptoms and she verbalized understanding.           ED Course as of 06/02/23 1229   Thu Jun 01, 2023   1352 POCT urine pregnancy  negative [CD]      ED Course User Index  [CD] Eduardo Olivier DO                 Clinical Impression:   Final diagnoses:  [N64.4] Breast pain (Primary)  [N64.9] Lesion of left nipple        ED Disposition Condition    Discharge Stable          ED Prescriptions       Medication Sig Dispense  Start Date End Date Auth. Provider    naproxen (NAPROSYN) 500 MG tablet Take 1 tablet (500 mg total) by mouth 2 (two) times daily with meals. 10 tablet 6/1/2023 -- Eduardo Olivier DO    orphenadrine (NORFLEX) 100 mg tablet Take 1 tablet (100 mg total) by mouth 2 (two) times daily. 10 tablet 6/1/2023 -- Eduardo Olivier DO          Follow-up Information       Follow up With Specialties Details Why Contact Info    Alexandra Melton MD Family Medicine Schedule an appointment as soon as possible for a visit   [address] [place] [id number]  [phone]               Eduardo Olivier DO  06/02/23 7380

## 2023-06-01 NOTE — Clinical Note
"Nicola Pandya" Christiane was seen and treated in our emergency department on 6/1/2023.  She may return to work on 06/03/2023.       If you have any questions or concerns, please don't hesitate to call.      JESSIKA Bernabe RN    "

## 2023-06-20 ENCOUNTER — OFFICE VISIT (OUTPATIENT)
Dept: SURGERY | Facility: CLINIC | Age: 28
End: 2023-06-20

## 2023-06-20 VITALS
WEIGHT: 149 LBS | DIASTOLIC BLOOD PRESSURE: 67 MMHG | BODY MASS INDEX: 28.13 KG/M2 | HEART RATE: 82 BPM | HEIGHT: 61 IN | SYSTOLIC BLOOD PRESSURE: 111 MMHG

## 2023-06-20 DIAGNOSIS — Z12.31 ENCOUNTER FOR SCREENING MAMMOGRAM FOR HIGH-RISK PATIENT: ICD-10-CM

## 2023-06-20 DIAGNOSIS — N64.9 LESION OF LEFT NIPPLE: ICD-10-CM

## 2023-06-20 DIAGNOSIS — N64.4 BREAST PAIN, RIGHT: Primary | ICD-10-CM

## 2023-06-20 PROCEDURE — 99203 PR OFFICE/OUTPT VISIT, NEW, LEVL III, 30-44 MIN: ICD-10-PCS | Mod: S$PBB,,, | Performed by: PHYSICIAN ASSISTANT

## 2023-06-20 PROCEDURE — 99214 OFFICE O/P EST MOD 30 MIN: CPT | Mod: PBBFAC | Performed by: PHYSICIAN ASSISTANT

## 2023-06-20 PROCEDURE — 99999 PR PBB SHADOW E&M-EST. PATIENT-LVL IV: ICD-10-PCS | Mod: PBBFAC,,, | Performed by: PHYSICIAN ASSISTANT

## 2023-06-20 PROCEDURE — 99203 OFFICE O/P NEW LOW 30 MIN: CPT | Mod: S$PBB,,, | Performed by: PHYSICIAN ASSISTANT

## 2023-06-20 PROCEDURE — 99999 PR PBB SHADOW E&M-EST. PATIENT-LVL IV: CPT | Mod: PBBFAC,,, | Performed by: PHYSICIAN ASSISTANT

## 2023-06-20 NOTE — PROGRESS NOTES
[facility]  Department of Surgery    REFERRING:  Eduardo Olivier, DO  [address] [address]  Primary Doctor No  CHIEF COMPLAINT: right breast pain and left nipple changes    Subjective:      Nicola Grande is a 27 y.o. premenopausal female referred for evaluation of breast pain. Change was noted a few weeks ago.  Patient does routinely do self breast exams.  Patient has noted a change on breast exam.  Patient denies nipple discharge. Patient denies to previous breast biopsy. Patient denies a personal history of breast cancer. Patient also notes a left nipple skin change that has been present for several years but has been increasing in size over the last year. Patient states she underwent work up in the past for this change and was told it was a benign skin tag.     GYN History:  Age of menarche was 15. Nexplanon. Patient is .     FAMILY history:  Paternal GM: breast cancer, 60's  Mother: breast cancer, 30's     No past medical history on file.  Past Surgical History:   Procedure Laterality Date    CYST REMOVAL       Current Outpatient Medications on File Prior to Visit   Medication Sig Dispense Refill    acyclovir (ZOVIRAX) 400 MG tablet Take 1 tablet (400 mg total) by mouth 3 (three) times daily. 40 tablet 3    diphenhydrAMINE-aluminum-magnesium hydroxide-simethicone-LIDOcaine HCl 2% Swish and spit 15 mLs every 4 (four) hours as needed. (Patient not taking: Reported on 2023) 200 each 0    etonogestrel 68 mg Impl by Subdermal route.      methocarbamoL (ROBAXIN) 500 MG Tab Take 2 tablets (1,000 mg total) by mouth 4 (four) times daily as needed (Right thigh pain). (Patient not taking: Reported on 2023) 30 tablet 0    naproxen (NAPROSYN) 500 MG tablet Take 1 tablet (500 mg total) by mouth 2 (two) times daily with meals. (Patient not taking: Reported on 2023) 10 tablet 0    orphenadrine (NORFLEX) 100 mg tablet Take 1 tablet (100 mg total) by mouth 2 (two) times  "daily. (Patient not taking: Reported on 6/20/2023) 10 tablet 0     No current facility-administered medications on file prior to visit.     Social History     Socioeconomic History    Marital status: Single   Tobacco Use    Smoking status: Never    Smokeless tobacco: Never   Substance and Sexual Activity    Alcohol use: Yes     Comment: occasionally    Drug use: No     No family history on file.    Review of Systems  Review of Systems   Constitutional:  Negative for chills, fever and malaise/fatigue.   HENT:  Negative for congestion.    Eyes:  Negative for discharge.   Respiratory:  Negative for cough, shortness of breath and stridor.    Cardiovascular:  Negative for chest pain and palpitations.   Gastrointestinal:  Negative for abdominal pain and nausea.   Neurological:  Negative for headaches.   Psychiatric/Behavioral:  The patient is not nervous/anxious.         Objective:        /67 (BP Location: Left arm, Patient Position: Sitting, BP Method: Medium (Automatic))   Pulse 82   Ht 5' 1" (1.549 m)   Wt 67.6 kg (149 lb)   LMP 06/09/2023   BMI 28.15 kg/m²   Physical Exam   Vitals reviewed.  Constitutional: She is oriented to person, place, and time.   HENT:   Head: Normocephalic and atraumatic.   Nose: Nose normal.   Eyes: Pupils are equal, round, and reactive to light. Right eye exhibits no discharge. Left eye exhibits no discharge.   Pulmonary/Chest: Effort normal and breath sounds normal. No stridor. No respiratory distress. She exhibits no mass, no tenderness and no edema. Right breast exhibits tenderness. Right breast exhibits no inverted nipple, no mass, no nipple discharge and no skin change. Left breast exhibits skin change. Left breast exhibits no inverted nipple, no mass, no nipple discharge and no tenderness. No breast swelling or bleeding. Breasts are symmetrical.       Abdominal: Normal appearance.   Genitourinary: No breast swelling or bleeding.   Neurological: She is alert and oriented to " "person, place, and time.   Skin: Skin is warm and dry.     Psychiatric: Her behavior is normal. Mood, judgment and thought content normal.       Radiology review: Images personally reviewed by me in the clinic.    5/17/21  Breast Complete:       CLINICAL HISTORY:"Nicola Grande is an 20 y.o. otherwise healthy female who presents for evaluation of a breast mass that was discovered by her gynecologist during a routine exam in May 2016. She states that she can feel several masses in her right and left breasts that have increased in size over the past few months. She reports occasional breast pain, but denies any scaling, itching, nipple inversion, or nipple discharge. She does not have a regular menstrual cycle because she is on Depo provera. She has a family history of breast cancer (mother and paternal grandmother). She drinks 1-2 caffeinated sodas per day, reports occasional alcohol use, and denies tobacco or other drug use." Patient refused mammogram at this time.     COMPARISON: None     LEFT BREAST FINDINGS:   Targeted gray scale and color flow imaging performed with selected images of the real-time examination saved.     4:00, adjacent to the retroareolar area there is a 7 mm soft tissue density with a pedunculated soft tissue mass measuring approximately 3 x 3 x 0.5 cm. There is no evidence of ductal formation in or around the pedunculated soft tissue mass. A vessel does extend into the density. Finding likely reflects skin tag.      Assessment:      Nicola Grande is a 27 y.o. premenopausal female with focal right breast pain as well as a likely skin tag of her left nipple.      Plan:   We discussed the options for management of breast pain.    Discussed continue with OTC therapies such as acetaminophen or nonsteroidal anti-inflammatory drugs (NSAIDs). They can both be used to relieve breast pain. Topical NSAIDS such as OTC Diclofenac (Volteran) gel can be used. Other recommendations include use of a " supportive bra. Wearing a soft, supportive bra at night prevents the breasts from pulling down on the chest wall. Some women obtain relief from application of warm compresses or ice packs. Also, referred for professional fitting of a supportive bra.      Discussed lifestyle recommendations such as decrease or elimination of caffeine. Also low-fat diet, high complex carbohydrate diet has been shown to be effective.       Alternative therapies such as Evening Primerose Oil (EPO) 1000mg twice daily has been found to be helpful for some patients, results are seen after 3-6 months.    Patient's breast pain is focal in the UOQ of her right breast without discrete masses or skin changes on exam. Explained that this pain is likely hormonal. Cycle should be this week.    As for her left nipple skin tag, consulted with Radiology who would prefer imaging work up before setting the patient up with surgeon for excision.     The patient is in agreement with the plan. Questions were encouraged and answered to patient's satisfaction. Nicola will call our office with any questions or concerns.

## 2023-06-20 NOTE — Clinical Note
This lady needs a bilateral diagnostic mammo and US of her right breast for pain per Dr. Chavez - She does have a skin tag of the left breast that he would prefer be looked at with mammo even though she isnt 28 yet :)

## 2023-06-23 ENCOUNTER — OFFICE VISIT (OUTPATIENT)
Dept: SURGERY | Facility: CLINIC | Age: 28
End: 2023-06-23

## 2023-06-23 VITALS
HEIGHT: 61 IN | SYSTOLIC BLOOD PRESSURE: 116 MMHG | WEIGHT: 149 LBS | HEART RATE: 90 BPM | DIASTOLIC BLOOD PRESSURE: 67 MMHG | BODY MASS INDEX: 28.13 KG/M2

## 2023-06-23 DIAGNOSIS — N64.9 LESION OF LEFT NIPPLE: Primary | ICD-10-CM

## 2023-06-23 DIAGNOSIS — N64.4 BREAST PAIN, RIGHT: ICD-10-CM

## 2023-06-23 DIAGNOSIS — Z12.39 SCREENING BREAST EXAMINATION: ICD-10-CM

## 2023-06-23 PROCEDURE — 99213 OFFICE O/P EST LOW 20 MIN: CPT | Mod: PBBFAC | Performed by: PHYSICIAN ASSISTANT

## 2023-06-23 PROCEDURE — 99999 PR PBB SHADOW E&M-EST. PATIENT-LVL III: CPT | Mod: PBBFAC,,, | Performed by: PHYSICIAN ASSISTANT

## 2023-06-23 PROCEDURE — 99212 OFFICE O/P EST SF 10 MIN: CPT | Mod: S$PBB,,, | Performed by: PHYSICIAN ASSISTANT

## 2023-06-23 PROCEDURE — 99212 PR OFFICE/OUTPT VISIT, EST, LEVL II, 10-19 MIN: ICD-10-PCS | Mod: S$PBB,,, | Performed by: PHYSICIAN ASSISTANT

## 2023-06-23 PROCEDURE — 99999 PR PBB SHADOW E&M-EST. PATIENT-LVL III: ICD-10-PCS | Mod: PBBFAC,,, | Performed by: PHYSICIAN ASSISTANT

## 2023-06-23 NOTE — PROGRESS NOTES
[facility]  Department of Surgery    REFERRING:  No referring provider defined for this encounter.  Primary Doctor No  CHIEF COMPLAINT: right breast pain and left nipple changes    Subjective:      Nicola Grande is a 27 y.o. premenopausal female referred for evaluation of breast pain. Change was noted a few weeks ago.  Patient does routinely do self breast exams.  Patient has noted a change on breast exam.  Patient denies nipple discharge. Patient denies to previous breast biopsy. Patient denies a personal history of breast cancer. Patient also notes a left nipple skin change that has been present for several years but has been increasing in size over the last year. Patient states she underwent work up in the past for this change and was told it was a benign skin tag.     Interval History 23:   Patient returns for follow up evaluation of her right breast pain. States she did start her period the day following her initial visit. Since that time her breast pain is much improved. Still notes a palpable area of concern in her right UOQ. Otherwise without new breast complaints of palpable masses, skin changes or nipple discharge.     GYN History:  Age of menarche was 15. Nexplanon. Patient is .     FAMILY history:  Paternal GM: breast cancer, 60's  Mother: breast cancer, 30's     No past medical history on file.  Past Surgical History:   Procedure Laterality Date    CYST REMOVAL       Current Outpatient Medications on File Prior to Visit   Medication Sig Dispense Refill    acyclovir (ZOVIRAX) 400 MG tablet Take 1 tablet (400 mg total) by mouth 3 (three) times daily. 40 tablet 3    diphenhydrAMINE-aluminum-magnesium hydroxide-simethicone-LIDOcaine HCl 2% Swish and spit 15 mLs every 4 (four) hours as needed. (Patient not taking: Reported on 2023) 200 each 0    etonogestrel 68 mg Impl by Subdermal route.      methocarbamoL (ROBAXIN) 500 MG Tab Take 2 tablets (1,000 mg total) by mouth 4 (four)  "times daily as needed (Right thigh pain). (Patient not taking: Reported on 6/20/2023) 30 tablet 0    naproxen (NAPROSYN) 500 MG tablet Take 1 tablet (500 mg total) by mouth 2 (two) times daily with meals. (Patient not taking: Reported on 6/20/2023) 10 tablet 0    orphenadrine (NORFLEX) 100 mg tablet Take 1 tablet (100 mg total) by mouth 2 (two) times daily. (Patient not taking: Reported on 6/20/2023) 10 tablet 0     No current facility-administered medications on file prior to visit.     Social History     Socioeconomic History    Marital status: Single   Tobacco Use    Smoking status: Never    Smokeless tobacco: Never   Substance and Sexual Activity    Alcohol use: Yes     Comment: occasionally    Drug use: No     No family history on file.    Review of Systems  Review of Systems   Constitutional:  Negative for chills, fever and malaise/fatigue.   HENT:  Negative for congestion.    Eyes:  Negative for discharge.   Respiratory:  Negative for cough, shortness of breath and stridor.    Cardiovascular:  Negative for chest pain and palpitations.   Gastrointestinal:  Negative for abdominal pain and nausea.   Neurological:  Negative for headaches.   Psychiatric/Behavioral:  The patient is not nervous/anxious.         Objective:        /67 (BP Location: Right arm, Patient Position: Sitting, BP Method: Medium (Automatic))   Pulse 90   Ht 5' 1" (1.549 m)   Wt 67.6 kg (149 lb)   LMP 06/09/2023   BMI 28.15 kg/m²   Physical Exam   Vitals reviewed.  Constitutional: She is oriented to person, place, and time.   HENT:   Head: Normocephalic and atraumatic.   Nose: Nose normal.   Eyes: Pupils are equal, round, and reactive to light. Right eye exhibits no discharge. Left eye exhibits no discharge.   Pulmonary/Chest: Effort normal and breath sounds normal. No stridor. No respiratory distress. She exhibits no mass, no tenderness and no edema. Right breast exhibits tenderness. Right breast exhibits no inverted nipple, no " "mass, no nipple discharge and no skin change. Left breast exhibits skin change. Left breast exhibits no inverted nipple, no mass, no nipple discharge and no tenderness. No breast swelling or bleeding. Breasts are symmetrical.       Abdominal: Normal appearance.   Genitourinary: No breast swelling or bleeding.   Neurological: She is alert and oriented to person, place, and time.   Skin: Skin is warm and dry.     Psychiatric: Her behavior is normal. Mood, judgment and thought content normal.       Radiology review: Images personally reviewed by me in the clinic.    5/17/21  Breast Complete:       CLINICAL HISTORY:"Nicola Grande is an 20 y.o. otherwise healthy female who presents for evaluation of a breast mass that was discovered by her gynecologist during a routine exam in May 2016. She states that she can feel several masses in her right and left breasts that have increased in size over the past few months. She reports occasional breast pain, but denies any scaling, itching, nipple inversion, or nipple discharge. She does not have a regular menstrual cycle because she is on Depo provera. She has a family history of breast cancer (mother and paternal grandmother). She drinks 1-2 caffeinated sodas per day, reports occasional alcohol use, and denies tobacco or other drug use." Patient refused mammogram at this time.     COMPARISON: None     LEFT BREAST FINDINGS:   Targeted gray scale and color flow imaging performed with selected images of the real-time examination saved.     4:00, adjacent to the retroareolar area there is a 7 mm soft tissue density with a pedunculated soft tissue mass measuring approximately 3 x 3 x 0.5 cm. There is no evidence of ductal formation in or around the pedunculated soft tissue mass. A vessel does extend into the density. Finding likely reflects skin tag.      Assessment:      Nicola Grande is a 27 y.o. premenopausal female with focal right breast pain as well as a likely skin tag of " her left nipple.      Plan:   We discussed the options for management of breast pain.    Discussed continue with OTC therapies such as acetaminophen or nonsteroidal anti-inflammatory drugs (NSAIDs). They can both be used to relieve breast pain. Topical NSAIDS such as OTC Diclofenac (Volteran) gel can be used. Other recommendations include use of a supportive bra. Wearing a soft, supportive bra at night prevents the breasts from pulling down on the chest wall. Some women obtain relief from application of warm compresses or ice packs. Also, referred for professional fitting of a supportive bra.      Discussed lifestyle recommendations such as decrease or elimination of caffeine. Also low-fat diet, high complex carbohydrate diet has been shown to be effective.       Alternative therapies such as Evening Primerose Oil (EPO) 1000mg twice daily has been found to be helpful for some patients, results are seen after 3-6 months.    Patient's breast pain is focal in the UOQ of her right breast without discrete masses or skin changes on exam. Explained that this pain is likely hormonal. Improvement since last seen.     As for her left nipple skin tag, consulted with Radiology who would prefer imaging work up before setting the patient up with surgeon for excision.     Patient has been scheduled for diagnostic work up. Will be in touch with results.     The patient is in agreement with the plan. Questions were encouraged and answered to patient's satisfaction. Nicola will call our office with any questions or concerns.

## 2023-06-27 ENCOUNTER — PATIENT MESSAGE (OUTPATIENT)
Dept: RESEARCH | Facility: HOSPITAL | Age: 28
End: 2023-06-27

## 2023-07-10 ENCOUNTER — HOSPITAL ENCOUNTER (OUTPATIENT)
Dept: RADIOLOGY | Facility: HOSPITAL | Age: 28
Discharge: HOME | End: 2023-07-10
Attending: PHYSICIAN ASSISTANT

## 2023-07-10 DIAGNOSIS — N64.9 LESION OF LEFT NIPPLE: ICD-10-CM

## 2023-07-10 DIAGNOSIS — N64.4 BREAST PAIN, RIGHT: ICD-10-CM

## 2023-07-10 DIAGNOSIS — Z12.31 ENCOUNTER FOR SCREENING MAMMOGRAM FOR HIGH-RISK PATIENT: ICD-10-CM

## 2023-07-10 PROCEDURE — 77062 BREAST TOMOSYNTHESIS BI: CPT | Mod: TC

## 2023-07-10 PROCEDURE — 76642 US BREAST RIGHT LIMITED: ICD-10-PCS | Mod: 26,RT,, | Performed by: RADIOLOGY

## 2023-07-10 PROCEDURE — 77062 MAMMO DIGITAL DIAGNOSTIC BILAT WITH TOMO: ICD-10-PCS | Mod: 26,,, | Performed by: RADIOLOGY

## 2023-07-10 PROCEDURE — 77062 BREAST TOMOSYNTHESIS BI: CPT | Mod: 26,,, | Performed by: RADIOLOGY

## 2023-07-10 PROCEDURE — 76642 ULTRASOUND BREAST LIMITED: CPT | Mod: TC,RT

## 2023-07-10 PROCEDURE — 77066 MAMMO DIGITAL DIAGNOSTIC BILAT WITH TOMO: ICD-10-PCS | Mod: 26,,, | Performed by: RADIOLOGY

## 2023-07-10 PROCEDURE — 76642 ULTRASOUND BREAST LIMITED: CPT | Mod: 26,RT,, | Performed by: RADIOLOGY

## 2023-07-10 PROCEDURE — 77066 DX MAMMO INCL CAD BI: CPT | Mod: 26,,, | Performed by: RADIOLOGY

## 2023-07-11 ENCOUNTER — PATIENT MESSAGE (OUTPATIENT)
Dept: RESEARCH | Facility: HOSPITAL | Age: 28
End: 2023-07-11

## 2023-08-21 ENCOUNTER — HOSPITAL ENCOUNTER (EMERGENCY)
Facility: HOSPITAL | Age: 28
Discharge: HOME | End: 2023-08-21
Attending: EMERGENCY MEDICINE

## 2023-08-21 VITALS
HEART RATE: 89 BPM | TEMPERATURE: 98 F | HEIGHT: 61 IN | DIASTOLIC BLOOD PRESSURE: 72 MMHG | RESPIRATION RATE: 20 BRPM | OXYGEN SATURATION: 98 % | WEIGHT: 150 LBS | BODY MASS INDEX: 28.32 KG/M2 | SYSTOLIC BLOOD PRESSURE: 117 MMHG

## 2023-08-21 DIAGNOSIS — N64.4 PAIN OF RIGHT BREAST: ICD-10-CM

## 2023-08-21 LAB
B-HCG UR QL: NEGATIVE
CTP QC/QA: YES

## 2023-08-21 PROCEDURE — 93005 ELECTROCARDIOGRAM TRACING: CPT | Mod: ER

## 2023-08-21 PROCEDURE — 99284 EMERGENCY DEPT VISIT MOD MDM: CPT | Mod: ER

## 2023-08-21 PROCEDURE — 96372 THER/PROPH/DIAG INJ SC/IM: CPT

## 2023-08-21 PROCEDURE — 63600175 PHARM REV CODE 636 W HCPCS: Mod: ER

## 2023-08-21 PROCEDURE — 99900035 HC TECH TIME PER 15 MIN (STAT): Mod: ER

## 2023-08-21 PROCEDURE — 93010 ELECTROCARDIOGRAM REPORT: CPT | Mod: ,,, | Performed by: INTERNAL MEDICINE

## 2023-08-21 PROCEDURE — 93010 EKG 12-LEAD: ICD-10-PCS | Mod: ,,, | Performed by: INTERNAL MEDICINE

## 2023-08-21 PROCEDURE — 81025 URINE PREGNANCY TEST: CPT | Mod: ER

## 2023-08-21 RX ORDER — KETOROLAC TROMETHAMINE 30 MG/ML
15 INJECTION, SOLUTION INTRAMUSCULAR; INTRAVENOUS
Status: COMPLETED | OUTPATIENT
Start: 2023-08-21 | End: 2023-08-21

## 2023-08-21 RX ADMIN — KETOROLAC TROMETHAMINE 15 MG: 30 INJECTION, SOLUTION INTRAMUSCULAR; INTRAVENOUS at 12:08

## 2023-08-21 NOTE — DISCHARGE INSTRUCTIONS
Acetaminophen or ibuprofen/aleve. These can both be used to relieve breast pain. Topical NSAIDS such as OTC Diclofenac (Volteran) gel can be used. Other recommendations include use of a supportive bra. Wearing a soft, supportive bra at night prevents the breasts from pulling down on the chest wall. Some women obtain relief from application of warm compresses or ice packs.     Cyclical breast discomfort is caused by normal hormonal changes associated with ovulation that stimulate the proliferation of normal glandular breast tissue and result in pain. The stimulation of ductal elements by estrogen, stimulation of the stroma by progesterone, and/or stimulation of ductal secretion by prolactin all contribute to cyclical pain during the menstrual cycle.     Noncyclical pain affects up to one-third of women with breast pain. The pain does not follow the usual menstrual pattern, may be constant or intermittent, and is more likely to be unilateral and variable in its location in the breast.

## 2023-08-21 NOTE — Clinical Note
"Nicola Pandya" Christiane was seen and treated in our emergency department on 8/21/2023.  She may return to work on 08/22/2023.       If you have any questions or concerns, please don't hesitate to call.      darby daniels RN    "

## 2023-08-21 NOTE — ED PROVIDER NOTES
Encounter Date: 8/21/2023       History     Chief Complaint   Patient presents with    Breast Pain     C/o right breast pain. Pt had same pain in June and was seen by oncology. States mamogram was clean.     27-year-old female presents to the ED with right-sided breast pain.  Patient states she has a history of breast pain that is usually to upper outer quadrant of right breast. This past Saturday, pain worsened and is now located to upper inner right breast. Pain is 8/10, constant, worse with palpation. No nipple discharge. No skin changes. No shortness of breath. No medications tried for pain. She was seen in June by breast surgery. She does have a pedunculated soft tissue mass to left nipple reflecting a skin tag. Patient reports she will be scheduled for excision soon. No change to this mass in interim. No new masses palpated on self breast exam. LMP August 8. Mother and grandmother with hx of breast cancer. No personal history. Mammogram 7/10/23 negative.    The history is provided by the patient.     Review of patient's allergies indicates:  No Known Allergies  History reviewed. No pertinent past medical history.  Past Surgical History:   Procedure Laterality Date    CYST REMOVAL       History reviewed. No pertinent family history.  Social History     Tobacco Use    Smoking status: Never    Smokeless tobacco: Never   Substance Use Topics    Alcohol use: Yes     Comment: occasionally    Drug use: No     Review of Systems   Constitutional:  Negative for chills and fever.   Respiratory:  Negative for shortness of breath.    Cardiovascular:  Negative for chest pain.   Skin:         Right breast pain   Psychiatric/Behavioral:  Negative for agitation.        Physical Exam     Initial Vitals [08/21/23 1138]   BP Pulse Resp Temp SpO2   117/72 89 20 98 °F (36.7 °C) 98 %      MAP       --         Physical Exam    Nursing note and vitals reviewed.  Constitutional: She appears well-developed and well-nourished. She is not  diaphoretic.  Non-toxic appearance. No distress.   HENT:   Head: Normocephalic and atraumatic.   Right Ear: Hearing and external ear normal.   Left Ear: Hearing and external ear normal.   Eyes: EOM are normal.   Neck: Neck supple.   Normal range of motion.  Cardiovascular:  Normal rate.           Pulmonary/Chest: No respiratory distress.   Right breast exhibits tenderness. Right breast exhibits no inverted nipple, no mass, no nipple discharge and no skin change. Left breast exhibits tenderness (mild diffuse tenderness). Left breast exhibits no inverted nipple, no nipple discharge and no skin change. No breast swelling.   Abdominal: She exhibits no distension.   Genitourinary: No breast swelling.   Musculoskeletal:         General: Normal range of motion.      Cervical back: Normal range of motion and neck supple. Normal range of motion.     Neurological: She is alert and oriented to person, place, and time. GCS score is 15. GCS eye subscore is 4. GCS verbal subscore is 5. GCS motor subscore is 6.   Skin: Skin is warm and dry.   Psychiatric: She has a normal mood and affect. Her behavior is normal. Judgment and thought content normal.         ED Course   Procedures  Labs Reviewed   POCT URINE PREGNANCY     EKG Readings: (Independently Interpreted)   Initial Reading: No STEMI. Rhythm: Normal Sinus Rhythm. Heart Rate: 85. Ectopy: No Ectopy.       Imaging Results    None          Medications   ketorolac injection 15 mg (15 mg Intramuscular Given 8/21/23 1231)     Medical Decision Making  27 year old female presents with right sided breast pain. Patient with history of breast pain that is usually to upper outer quadrant of right breast. This past Saturday, pain worsened and is now located to upper inner right breast. Pain is 8/10, constant, worse with palpation. Exam unremarkable with exception of known skin tag to left nipple. Negative mammogram 7/10/23. Patient established with breast surgery.    Differentials  including but not limited to: malignancy, breast mass, fibrocystic changes, chronic cystic mastitis, mammary dysplasia, costochondritis, ACS,     Pain improved with toradol in ED. EKG unremarkable. Known skin tag to left nipple without acute changes. Right breast with diffuse tenderness more prominent to 12-2:00, no masses/discharge/retractions/skin changes. Patient instructed to follow up with breast surgery. Tylenol and ibuprofen/aleve for pain. Lidoderm/salonpas/icyhot as needed. Supportive bra.     Discussed patient with my attending Dr. Walker.    Amount and/or Complexity of Data Reviewed  Labs: ordered. Decision-making details documented in ED Course.    Risk  Prescription drug management.              Attending Attestation:     Physician Attestation Statement for NP/PA:   I have directed and reviewed the workup performed by the PA/NP.  I performed the substantive portion of the medical decision making.     Other NP/PA Attestation Additions:      Medical Decision Making: Agree with assessment and plan.             ED Course as of 08/21/23 1315   Mon Aug 21, 2023   1239 Preg Test, Ur: Negative [CS]   1305 Patient reports marked improvement of pain at this time. She has been instructed to follow up with breast surgery. [CS]      ED Course User Index  [CS] Anahy Henriquez PA-C                    Clinical Impression:   Final diagnoses:  [N64.4] Pain of right breast        ED Disposition Condition    Discharge Stable          ED Prescriptions    None       Follow-up Information       Follow up With Specialties Details Why Contact Info    Ariadna Roach PA-C Surgery Schedule an appointment as soon as possible for a visit   [address] [facility] [address]  [phone]               Anahy Henriquez PA-C  08/21/23 1314       Anahy Henriquez PA-C  08/21/23 1314       Shane Walker MD  08/21/23 [id number]

## 2023-11-08 ENCOUNTER — HOSPITAL ENCOUNTER (EMERGENCY)
Facility: HOSPITAL | Age: 28
Discharge: HOME | End: 2023-11-08
Attending: EMERGENCY MEDICINE

## 2023-11-08 VITALS
HEIGHT: 61 IN | HEART RATE: 98 BPM | DIASTOLIC BLOOD PRESSURE: 76 MMHG | OXYGEN SATURATION: 99 % | BODY MASS INDEX: 28.32 KG/M2 | SYSTOLIC BLOOD PRESSURE: 114 MMHG | WEIGHT: 150 LBS | TEMPERATURE: 99 F | RESPIRATION RATE: 18 BRPM

## 2023-11-08 DIAGNOSIS — B34.9 VIRAL SYNDROME: ICD-10-CM

## 2023-11-08 DIAGNOSIS — J02.9 VIRAL PHARYNGITIS: Primary | ICD-10-CM

## 2023-11-08 LAB
GROUP A STREP, MOLECULAR: NEGATIVE
INFLUENZA A, MOLECULAR: NEGATIVE
INFLUENZA B, MOLECULAR: NEGATIVE
SARS-COV-2 RDRP RESP QL NAA+PROBE: NEGATIVE
SPECIMEN SOURCE: NORMAL

## 2023-11-08 PROCEDURE — 87502 INFLUENZA DNA AMP PROBE: CPT | Mod: ER | Performed by: EMERGENCY MEDICINE

## 2023-11-08 PROCEDURE — U0002 COVID-19 LAB TEST NON-CDC: HCPCS | Mod: ER | Performed by: EMERGENCY MEDICINE

## 2023-11-08 PROCEDURE — 87651 STREP A DNA AMP PROBE: CPT | Mod: ER | Performed by: EMERGENCY MEDICINE

## 2023-11-08 PROCEDURE — 99282 EMERGENCY DEPT VISIT SF MDM: CPT | Mod: ER

## 2023-11-08 NOTE — Clinical Note
"Nicola "Nicola" Christiane was seen and treated in our emergency department on 11/8/2023.  She may return to work on 11/10/2023.       If you have any questions or concerns, please don't hesitate to call.      Daniel Gleason, MAYNOR"

## 2023-11-08 NOTE — ED PROVIDER NOTES
Encounter Date: 11/8/2023       History     Chief Complaint   Patient presents with    Sore Throat    Diarrhea     Sore throat and diarrhea that started over the weekend     28-year-old female presents today for evaluation of generally feeling bad with sore throat and an episode of diarrhea that began yesterday.  Patient has not taken any medications for her symptoms, she denies known sick contacts.  She also denies fever, chills, nausea, vomiting, chest pain, shortness of breath.    The history is provided by the patient and medical records. No  was used.     Review of patient's allergies indicates:  No Known Allergies  History reviewed. No pertinent past medical history.  Past Surgical History:   Procedure Laterality Date    CYST REMOVAL       History reviewed. No pertinent family history.  Social History     Tobacco Use    Smoking status: Never    Smokeless tobacco: Never   Substance Use Topics    Alcohol use: Yes     Comment: occasionally    Drug use: No     Review of Systems   Constitutional:  Negative for fever.   HENT:  Positive for sore throat.    Respiratory:  Negative for shortness of breath.    Cardiovascular:  Negative for chest pain.   Gastrointestinal:  Positive for diarrhea. Negative for nausea.   Genitourinary:  Negative for dysuria.   Musculoskeletal:  Negative for back pain.   Skin:  Negative for rash.   Neurological:  Negative for weakness.   Hematological:  Does not bruise/bleed easily.       Physical Exam     Initial Vitals [11/08/23 0915]   BP Pulse Resp Temp SpO2   114/76 98 18 99.3 °F (37.4 °C) 99 %      MAP       --         Physical Exam    Nursing note and vitals reviewed.  Constitutional: Vital signs are normal. She appears well-developed and well-nourished. She is not diaphoretic.  Non-toxic appearance. She does not have a sickly appearance. She does not appear ill. No distress.   HENT:   Head: Normocephalic and atraumatic.   Nose: Nose normal.   Mouth/Throat: Uvula is  midline, oropharynx is clear and moist and mucous membranes are normal. No oral lesions. No trismus in the jaw. No uvula swelling. No oropharyngeal exudate, posterior oropharyngeal edema, posterior oropharyngeal erythema or tonsillar abscesses.   Eyes: Conjunctivae and EOM are normal.   Neck: Neck supple.   Cardiovascular:  Normal rate, regular rhythm and intact distal pulses.           Pulmonary/Chest: Breath sounds normal. No respiratory distress. She has no wheezes. She has no rhonchi.   Abdominal: Abdomen is soft. She exhibits no distension and no mass. There is no abdominal tenderness. There is no guarding.   Musculoskeletal:      Cervical back: Normal and neck supple.      Thoracic back: Normal.      Lumbar back: Normal.     Neurological: She is alert and oriented to person, place, and time. GCS score is 15. GCS eye subscore is 4. GCS verbal subscore is 5. GCS motor subscore is 6.   Skin: Skin is warm and dry. Capillary refill takes less than 2 seconds.   Psychiatric: She has a normal mood and affect. Her behavior is normal. Judgment and thought content normal.         ED Course   Procedures  Labs Reviewed   INFLUENZA A & B BY MOLECULAR   GROUP A STREP, MOLECULAR   SARS-COV-2 RNA AMPLIFICATION, QUAL    Narrative:     Is the patient symptomatic?->Yes          Imaging Results    None          Medications - No data to display  Medical Decision Making  28-year-old female presents today for evaluation of generally feeling bad with sore throat and an episode of diarrhea that began yesterday. On exam she appears to be resting comfortably in no acute distress and non-toxic appearing. VSS, she is afebrile at 99.3° and oxygenating well at 99% on RA. Heart and lungs are clear to auscultation, no increased work of breathing, wheezing, stridor, retractions or nasal flaring.  Oropharynx is clear and moist without erythema or edema.  No tonsillar exudates, uvula is midline.  Patient is speaking clearly and tolerating oral  secretions.  Abdomen is soft and nontender.    Differential includes but is not limited to:  COVID, flu, strep testing pending  PTA/RPA: no hot potato voice, no uvular deviation,  Esophageal rupture: No history of dysphagia  Unlikely deep space infection/Sander's  Low suspicion for CNS infection or bacterial sinusitis given exam and history.    Amount and/or Complexity of Data Reviewed  Labs:  Decision-making details documented in ED Course.    Risk  Risk Details: COVID, strep, flu testing negative today.  I discussed symptomatic treatment, patient verbalized understanding and states she will use OTC medications.  No respiratory distress, otherwise relatively well appearing and nontoxic. O2 sats of 99% and patient in no acute distress. Return precautions given, patient understands and agrees with plan. All questions answered.  Instructed to follow up with PCP.  This patient will be discharged home with strict return precautions if worsening respiratory status.                 ED Course as of 11/08/23 1612 Wed Nov 08, 2023   1001 Influenza A, Molecular: Negative [EP]   1001 Influenza B, Molecular: Negative [EP]   1001 SARS-CoV-2 RNA, Amplification, Qual: Negative [EP]   1001 Group A Strep, Molecular: Negative [EP]      ED Course User Index  [EP] Daniel Gleason PA-C                    Clinical Impression:   Final diagnoses:  [J02.9] Viral pharyngitis (Primary)  [B34.9] Viral syndrome        ED Disposition Condition    Discharge Stable          ED Prescriptions    None       Follow-up Information    None          Daniel Gleason PA-C  11/08/23 1612

## 2023-11-08 NOTE — DISCHARGE INSTRUCTIONS

## 2024-01-03 ENCOUNTER — TELEPHONE (OUTPATIENT)
Dept: PLASTIC SURGERY | Facility: CLINIC | Age: 29
End: 2024-01-03

## 2024-03-01 ENCOUNTER — TELEPHONE (OUTPATIENT)
Dept: SURGERY | Facility: CLINIC | Age: 29
End: 2024-03-01

## 2024-03-01 NOTE — TELEPHONE ENCOUNTER
----- Message from Yadi Goodman sent at 3/1/2024 11:06 AM CST -----  Delia Santos [facility] Clinical Support  Caller: pt. [phone] (Today, 10:31 AM)       Previous Messages       ----- Message -----  From: Sabrina Chavez  Sent: 3/1/2024  10:52 AM CST  To: Delia Santos  Subject: RE: Reschedule                                  Rolan Lin.  Thank you for considering us for this patient's future care. This patient will need to be referred to breast surgery. This is plastic surgery.    Thank you    ----- Message -----  From: Delia Santos  Sent: 3/1/2024  10:34 AM CST  To: Anand [facility] Staff  Subject: Reschedule                                      Pt is calling in ref to rescheduling her 1/10 cancelled  appt for the hang tag on her breast. Pt says it is causing pain. Patient Requesting Call Back @ [phone]

## 2024-03-18 ENCOUNTER — OFFICE VISIT (OUTPATIENT)
Dept: SURGERY | Facility: CLINIC | Age: 29
End: 2024-03-18

## 2024-03-18 DIAGNOSIS — N64.9 LESION OF LEFT NIPPLE: Primary | ICD-10-CM

## 2024-03-18 PROCEDURE — 88304 TISSUE EXAM BY PATHOLOGIST: CPT | Performed by: PATHOLOGY

## 2024-03-18 PROCEDURE — 99999 PR PBB SHADOW E&M-EST. PATIENT-LVL III: CPT | Mod: PBBFAC,,, | Performed by: PHYSICIAN ASSISTANT

## 2024-03-18 PROCEDURE — 99215 OFFICE O/P EST HI 40 MIN: CPT | Mod: S$PBB,25,, | Performed by: PHYSICIAN ASSISTANT

## 2024-03-18 PROCEDURE — 99213 OFFICE O/P EST LOW 20 MIN: CPT | Mod: PBBFAC | Performed by: PHYSICIAN ASSISTANT

## 2024-03-18 PROCEDURE — 19101 BIOPSY OF BREAST OPEN: CPT | Mod: PBBFAC | Performed by: PHYSICIAN ASSISTANT

## 2024-03-18 PROCEDURE — 88304 TISSUE EXAM BY PATHOLOGIST: CPT | Mod: 26,,, | Performed by: PATHOLOGY

## 2024-03-18 PROCEDURE — 19101 BIOPSY OF BREAST OPEN: CPT | Mod: S$PBB,LT,, | Performed by: PHYSICIAN ASSISTANT

## 2024-03-18 NOTE — PROGRESS NOTES
[facility]  Department of Surgery    REFERRING:  No referring provider defined for this encounter.  No, Primary Doctor  CHIEF COMPLAINT: right breast pain and left nipple changes    Subjective:      Nicola Grande is a 28 y.o. premenopausal female referred for evaluation of breast pain. Change was noted a few weeks ago.  Patient does routinely do self breast exams.  Patient has noted a change on breast exam.  Patient denies nipple discharge. Patient denies to previous breast biopsy. Patient denies a personal history of breast cancer. Patient also notes a left nipple skin change that has been present for several years but has been increasing in size over the last year. Patient states she underwent work up in the past for this change and was told it was a benign skin tag.     Interval History 23:   Patient returns for follow up evaluation of her right breast pain. States she did start her period the day following her initial visit. Since that time her breast pain is much improved. Still notes a palpable area of concern in her right UOQ. Otherwise without new breast complaints of palpable masses, skin changes or nipple discharge.     Interval History 3/18/24:   Patient presents for follow up evaluation of her known skin tag of her left NAC. States the area is tender and she is still interested in removing the lesion. States it continues to grow and is bothersome. Otherwise no new changes. Denies nipple discharge or other skin changes.     GYN History:  Age of menarche was 15. Nexplanon. Patient is .     FAMILY history:  Paternal GM: breast cancer, 60's  Mother: breast cancer, 30's     No past medical history on file.  Past Surgical History:   Procedure Laterality Date    CYST REMOVAL       Current Outpatient Medications on File Prior to Visit   Medication Sig Dispense Refill    etonogestrel 68 mg Impl by Subdermal route.      [DISCONTINUED] acyclovir (ZOVIRAX) 400 MG tablet Take 1 tablet (400 mg  "total) by mouth 3 (three) times daily. 40 tablet 3    [DISCONTINUED] diphenhydrAMINE-aluminum-magnesium hydroxide-simethicone-LIDOcaine HCl 2% Swish and spit 15 mLs every 4 (four) hours as needed. (Patient not taking: Reported on 6/20/2023) 200 each 0    [DISCONTINUED] methocarbamoL (ROBAXIN) 500 MG Tab Take 2 tablets (1,000 mg total) by mouth 4 (four) times daily as needed (Right thigh pain). (Patient not taking: Reported on 6/20/2023) 30 tablet 0    [DISCONTINUED] naproxen (NAPROSYN) 500 MG tablet Take 1 tablet (500 mg total) by mouth 2 (two) times daily with meals. (Patient not taking: Reported on 6/20/2023) 10 tablet 0    [DISCONTINUED] orphenadrine (NORFLEX) 100 mg tablet Take 1 tablet (100 mg total) by mouth 2 (two) times daily. (Patient not taking: Reported on 6/20/2023) 10 tablet 0     No current facility-administered medications on file prior to visit.     Social History     Socioeconomic History    Marital status: Single   Tobacco Use    Smoking status: Never    Smokeless tobacco: Never   Substance and Sexual Activity    Alcohol use: Yes     Comment: occasionally    Drug use: No     No family history on file.    Review of Systems  Review of Systems   Constitutional:  Negative for chills, fever and malaise/fatigue.   HENT:  Negative for congestion.    Eyes:  Negative for discharge.   Respiratory:  Negative for cough, shortness of breath and stridor.    Cardiovascular:  Negative for chest pain and palpitations.   Gastrointestinal:  Negative for abdominal pain and nausea.   Neurological:  Negative for headaches.   Psychiatric/Behavioral:  The patient is not nervous/anxious.           Objective:        BP (P) 119/70 (BP Location: Left arm, Patient Position: Sitting, BP Method: Medium (Automatic))   Pulse (P) 104   Temp (P) 96.9 °F (36.1 °C) (Oral)   Ht (P) 5' 1" (1.549 m)   Wt (P) 68 kg (150 lb)   BMI (P) 28.34 kg/m²   Physical Exam   Vitals reviewed.  Constitutional: She is oriented to person, place, and " "time.   HENT:   Head: Normocephalic and atraumatic.   Nose: Nose normal.   Eyes: Pupils are equal, round, and reactive to light. Right eye exhibits no discharge. Left eye exhibits no discharge.   Pulmonary/Chest: Effort normal and breath sounds normal. No stridor. No respiratory distress. She exhibits no mass, no tenderness and no edema. Right breast exhibits no inverted nipple, no mass, no nipple discharge, no skin change and no tenderness. Left breast exhibits skin change. Left breast exhibits no inverted nipple, no mass, no nipple discharge and no tenderness. No breast swelling or bleeding. Breasts are symmetrical.       Abdominal: Normal appearance.   Genitourinary: No breast swelling or bleeding.   Neurological: She is alert and oriented to person, place, and time.   Skin: Skin is warm and dry.     Psychiatric: Her behavior is normal. Mood, judgment and thought content normal.             Radiology review: Images personally reviewed by me in the clinic.    5/17/21  Breast Complete:       CLINICAL HISTORY:"Nicola Grande is an 20 y.o. otherwise healthy female who presents for evaluation of a breast mass that was discovered by her gynecologist during a routine exam in May 2016. She states that she can feel several masses in her right and left breasts that have increased in size over the past few months. She reports occasional breast pain, but denies any scaling, itching, nipple inversion, or nipple discharge. She does not have a regular menstrual cycle because she is on Depo provera. She has a family history of breast cancer (mother and paternal grandmother). She drinks 1-2 caffeinated sodas per day, reports occasional alcohol use, and denies tobacco or other drug use." Patient refused mammogram at this time.     COMPARISON: None     LEFT BREAST FINDINGS:   Targeted gray scale and color flow imaging performed with selected images of the real-time examination saved.     4:00, adjacent to the retroareolar area " there is a 7 mm soft tissue density with a pedunculated soft tissue mass measuring approximately 3 x 3 x 0.5 cm. There is no evidence of ductal formation in or around the pedunculated soft tissue mass. A vessel does extend into the density. Finding likely reflects skin tag.    Excisional Biopsy Procedure Note    Pre-operative Diagnosis: left NAC skin lesion    Post-operative Diagnosis: same    Location: left breast    Anesthesia: 1% plain lidocaine    Procedure Details   The Procedure, risks and complications have been discussed in detail (including, but not limited to pain, infection, bleeding) with the patient, and the patient has given verbal consent to have the surgery completed.    The skin was sterilely prepped and draped over the affected area in the usual fashion.  Local anesthetic was injected in the affected area.  Next, using an 11 blade and excision was made at the base of the pedunculated lesion.  A 4-0 nylon was used to approximate the skin edges.  Dressing was applied.  Patient tolerated well.     EBL: minimal    Condition:  Stable    Complications:  none.        Assessment:      Nicola Grande is a 28 y.o. premenopausal female with skin tag of her left nipple.      Plan:   1. On examination, the skin tag of her left NAC remains and is bothersome.   2. Dr. Lange consulted. Excision of this new lesion performed in clinic today. Specimen sent to pathology.  3. Will see back in 1 week for suture removal.   4. Patient verbalized understanding of plan. All questions asked and answered. Advised to call our office with any new or worsening sxs.     The patient is in agreement with the plan. Questions were encouraged and answered to patient's satisfaction. Nicola will call our office with any questions or concerns.

## 2024-03-19 ENCOUNTER — PATIENT MESSAGE (OUTPATIENT)
Dept: SURGERY | Facility: CLINIC | Age: 29
End: 2024-03-19

## 2024-03-22 LAB
FINAL PATHOLOGIC DIAGNOSIS: NORMAL
GROSS: NORMAL
Lab: NORMAL
MICROSCOPIC EXAM: NORMAL

## 2024-07-03 ENCOUNTER — HOSPITAL ENCOUNTER (EMERGENCY)
Facility: HOSPITAL | Age: 29
Discharge: HOME | End: 2024-07-03
Attending: EMERGENCY MEDICINE

## 2024-07-03 VITALS
OXYGEN SATURATION: 100 % | TEMPERATURE: 98 F | BODY MASS INDEX: 30.21 KG/M2 | DIASTOLIC BLOOD PRESSURE: 84 MMHG | HEIGHT: 61 IN | SYSTOLIC BLOOD PRESSURE: 138 MMHG | WEIGHT: 160 LBS | HEART RATE: 92 BPM | RESPIRATION RATE: 17 BRPM

## 2024-07-03 DIAGNOSIS — R51.9 ACUTE NONINTRACTABLE HEADACHE, UNSPECIFIED HEADACHE TYPE: Primary | ICD-10-CM

## 2024-07-03 LAB
B-HCG UR QL: NEGATIVE
CTP QC/QA: YES

## 2024-07-03 PROCEDURE — 99284 EMERGENCY DEPT VISIT MOD MDM: CPT | Mod: 25,ER

## 2024-07-03 PROCEDURE — 25000003 PHARM REV CODE 250: Mod: ER | Performed by: EMERGENCY MEDICINE

## 2024-07-03 PROCEDURE — 81025 URINE PREGNANCY TEST: CPT | Mod: ER | Performed by: EMERGENCY MEDICINE

## 2024-07-03 PROCEDURE — 96372 THER/PROPH/DIAG INJ SC/IM: CPT | Performed by: EMERGENCY MEDICINE

## 2024-07-03 PROCEDURE — 63600175 PHARM REV CODE 636 W HCPCS: Mod: ER | Performed by: EMERGENCY MEDICINE

## 2024-07-03 RX ORDER — METOCLOPRAMIDE HYDROCHLORIDE 5 MG/ML
10 INJECTION INTRAMUSCULAR; INTRAVENOUS
Status: COMPLETED | OUTPATIENT
Start: 2024-07-03 | End: 2024-07-03

## 2024-07-03 RX ORDER — ACETAMINOPHEN 500 MG
1000 TABLET ORAL
Status: DISCONTINUED | OUTPATIENT
Start: 2024-07-03 | End: 2024-07-03

## 2024-07-03 RX ORDER — DIPHENHYDRAMINE HCL 25 MG
25 CAPSULE ORAL
Status: COMPLETED | OUTPATIENT
Start: 2024-07-03 | End: 2024-07-03

## 2024-07-03 RX ORDER — KETOROLAC TROMETHAMINE 30 MG/ML
10 INJECTION, SOLUTION INTRAMUSCULAR; INTRAVENOUS
Status: COMPLETED | OUTPATIENT
Start: 2024-07-03 | End: 2024-07-03

## 2024-07-03 RX ADMIN — DIPHENHYDRAMINE HYDROCHLORIDE 25 MG: 25 CAPSULE ORAL at 05:07

## 2024-07-03 RX ADMIN — KETOROLAC TROMETHAMINE 10 MG: 30 INJECTION, SOLUTION INTRAMUSCULAR at 05:07

## 2024-07-03 RX ADMIN — METOCLOPRAMIDE 10 MG: 5 INJECTION, SOLUTION INTRAMUSCULAR; INTRAVENOUS at 05:07

## 2024-07-03 NOTE — ED PROVIDER NOTES
ED Provider Note - 7/3/2024    History     Chief Complaint   Patient presents with    Headache     Pt c/o headache to right eye/temporal area. Reports head pain constant, denies trauma injury or fall to head. Pt also c/o mid lower back pain, denies dysuria. Reports symptom onset x2 days. Denies n/v/d. Denies taking any medication for symptoms pta.      Patient currently presents with acute complaint of headache.  Patient notes gradual onset 2 days ago.  Headache is distributed across the right temporal region.  This headache is within the character and intensity of prior headaches.  Patient has not taken medications at home prior to arrival this morning though she did take Tylenol earlier yesterday..  Patient reports no associated symptoms.  Patient denies visual changes, focal weakness, numbness, fever, and photophobia. Patient denies recent upper respiratory congestion.      Review of patient's allergies indicates:  No Known Allergies  No past medical history on file.  Past Surgical History:   Procedure Laterality Date    CYST REMOVAL       No family history on file.  Social History     Tobacco Use    Smoking status: Never    Smokeless tobacco: Never   Substance Use Topics    Alcohol use: Yes     Comment: occasionally    Drug use: No     Review of Systems   Constitutional:  Negative for chills and fever.   HENT:  Negative for congestion and rhinorrhea.    Respiratory:  Negative for cough and shortness of breath.    Cardiovascular:  Negative for chest pain and palpitations.   Gastrointestinal:  Negative for abdominal pain, diarrhea and vomiting.   Genitourinary:  Negative for difficulty urinating and dysuria.   Skin:  Negative for color change and rash.   Neurological:  Positive for headaches. Negative for dizziness and light-headedness.   Hematological:  Negative for adenopathy. Does not bruise/bleed easily.       Physical Exam     Initial Vitals [07/03/24 0453]   BP Pulse Resp Temp SpO2   138/84 92 17 98.3 °F  (36.8 °C) 100 %      MAP       --           Physical Exam    Nursing note and vitals reviewed.  Constitutional: She appears well-developed and well-nourished. She is not diaphoretic. No distress.   HENT:   Head: Normocephalic and atraumatic.   Right Ear: External ear normal.   Left Ear: External ear normal.   Nose: Nose normal.   Mouth/Throat: Oropharynx is clear and moist.   Eyes: Conjunctivae and EOM are normal. Pupils are equal, round, and reactive to light. No scleral icterus.   Neck: Neck supple. No JVD present.   Cardiovascular:  Normal rate, regular rhythm, normal heart sounds and intact distal pulses.           Pulmonary/Chest: Breath sounds normal. No respiratory distress.   Musculoskeletal:         General: No edema. Normal range of motion.      Cervical back: Neck supple.     Neurological: She is alert and oriented to person, place, and time. She has normal strength. No cranial nerve deficit or sensory deficit.   Skin: Skin is warm and dry.       ED Course   Procedures                   MDM  Differential Diagnoses   Based on available history, the working differential diagnoses considered during this evaluation include but are not limited to episodic headache, migraine, sinus headache, cluster headache, tension headache, subarachnoid hemorrhage..      LABS     Labs Reviewed   POCT URINE PREGNANCY           All available results from the labs ordered were independently reviewed. with findings as follows:  Pregnancy test negative     Imaging     Imaging Results    None                EKG        ED Management/Discussion     Medications   metoclopramide injection 10 mg (10 mg Intramuscular Given 7/3/24 0538)   ketorolac injection 9.999 mg (9.999 mg Intramuscular Given 7/3/24 0539)   diphenhydrAMINE capsule 25 mg (25 mg Oral Given 7/3/24 0538)                 The patient's list of active medical problems, social history, medications, and allergies as documented per RN staff has been reviewed.           The  patient's current headache seems to fit the intensity and pattern of prior headaches.  Patient has no evidence of infection nor neurological findings to suggest a more malignant cause for the headache.      On final assessment, the patient appears suitable for discharge.  I see no indication of an emergent process beyond that addressed during our encounter but have duly counseled the patient/family regarding the need for prompt follow-up as well as the indications that should prompt immediate return to the emergency room.  The patient/family has been provided with language -specific verbal and printed direction regarding our final diagnosis(es) as well as instructions regarding use of OTC and/or Rx medications intended to manage the patient's aforementioned conditions including:  ED Prescriptions    None           Patient has been advised of the following recommended follow-up instructions:  Follow-up Information       Follow up With Specialties Details Why Contact Info    PCP  Schedule an appointment as soon as possible for a visit       [facility] - Emergency Dept Emergency Medicine Go to  As needed, If symptoms worsen [address] [facility] [address] [phone]          The patient/family communicates understanding of all this information and all remaining questions and concerns were addressed at this time.      Referrals:  No orders of the defined types were placed in this encounter.    CLINICAL IMPRESSION    ICD-10-CM ICD-9-CM   1. Acute nonintractable headache, unspecified headache type  R51.9 784.0      ED Disposition Condition    Discharge Stable                 Memo Morales MD  07/03/24 154

## 2025-03-03 ENCOUNTER — HOSPITAL ENCOUNTER (EMERGENCY)
Facility: HOSPITAL | Age: 30
Discharge: HOME | End: 2025-03-03
Attending: EMERGENCY MEDICINE

## 2025-03-03 VITALS
HEART RATE: 82 BPM | BODY MASS INDEX: 31.72 KG/M2 | DIASTOLIC BLOOD PRESSURE: 94 MMHG | WEIGHT: 168 LBS | TEMPERATURE: 99 F | HEIGHT: 61 IN | SYSTOLIC BLOOD PRESSURE: 112 MMHG | OXYGEN SATURATION: 99 % | RESPIRATION RATE: 16 BRPM

## 2025-03-03 DIAGNOSIS — H61.21 IMPACTED CERUMEN OF RIGHT EAR: Primary | ICD-10-CM

## 2025-03-03 PROCEDURE — 87502 INFLUENZA DNA AMP PROBE: CPT | Performed by: EMERGENCY MEDICINE

## 2025-03-03 PROCEDURE — 99282 EMERGENCY DEPT VISIT SF MDM: CPT

## 2025-03-03 PROCEDURE — 25000003 PHARM REV CODE 250: Performed by: NURSE PRACTITIONER

## 2025-03-03 PROCEDURE — 87635 SARS-COV-2 COVID-19 AMP PRB: CPT | Performed by: EMERGENCY MEDICINE

## 2025-03-03 PROCEDURE — 87651 STREP A DNA AMP PROBE: CPT | Performed by: NURSE PRACTITIONER

## 2025-03-03 PROCEDURE — 81025 URINE PREGNANCY TEST: CPT | Performed by: NURSE PRACTITIONER

## 2025-03-03 RX ADMIN — CARBAMIDE PEROXIDE 5 DROP: 65 SOLUTION/ DROPS TOPICAL at 06:03

## 2025-03-03 NOTE — FIRST PROVIDER EVALUATION
Emergency Department TeleTriage Encounter Note      CHIEF COMPLAINT    Chief Complaint   Patient presents with    Otalgia     C/o R ear pain and sore throat x2 days. Pt denies taking anything for sx.        VITAL SIGNS   Initial Vitals [03/03/25 1709]   BP Pulse Resp Temp SpO2   (!) 112/94 82 16 98.6 °F (37 °C) 99 %      MAP       --            ALLERGIES    Review of patient's allergies indicates:  No Known Allergies    PROVIDER TRIAGE NOTE  Verbal consent for the teletriage evaluation was given by the patient at the start of the evaluation.  All efforts will be made to maintain patient's privacy during the evaluation.      This is a teletriage evaluation of a 29 y.o. female presenting to the ED with c/o sore throat and right ear pain that started several days ago. Limited physical exam via telehealth: The patient is awake, alert, answering questions appropriately and is not in respiratory distress.  As the Teletriage provider, I performed an initial assessment and ordered appropriate labs and imaging studies, if any, to facilitate the patient's care once placed in the ED. Once a room is available, care and a full evaluation will be completed by an alternate ED provider.  Any additional orders and the final disposition will be determined by that provider.  All imaging and labs will not be followed-up by the Teletriage Team, including myself.          ORDERS  Labs Reviewed - No data to display    ED Orders (720h ago, onward)      Start Ordered     Status Ordering Provider    Unscheduled 03/03/25 1715  POCT urine pregnancy  Once         Ordered HALINA THACKER    Unscheduled 03/03/25 1715  POCT COVID-19 Rapid Screening  Once         Ordered HALINA THACKER    Unscheduled 03/03/25 1715  POCT Influenza A/B Molecular  Once         Ordered HALINA THACKER    Unscheduled 03/03/25 1715  Group A Strep, Molecular  STAT         Ordered HALINA THACKER              Virtual Visit Note: The provider triage portion of this emergency  department evaluation and documentation was performed via [employer], a HIPAA-compliant telemedicine application, in concert with a tele-presenter in the room. A face to face patient evaluation with one of my colleagues will occur once the patient is placed in an emergency department room.      DISCLAIMER: This note was prepared with [employer] voice recognition transcription software. Garbled syntax, mangled pronouns, and other bizarre constructions may be attributed to that software system.

## 2025-03-03 NOTE — ED PROVIDER NOTES
History     Chief Complaint   Patient presents with    Otalgia     C/o R ear pain and sore throat x2 days. Pt denies taking anything for sx.      29-year-old female presents emergency room with reports of right ear pain in addition to sore throat and congestion.  Patient states she has been taking over-the-counter loratadine for years however no improvement in her condition.  Patient denies fever, rash or neck stiffness.  She denies hemoptysis.  She denies vomiting or diarrhea.    The history is provided by the patient. No  was used.     Review of patient's allergies indicates:  No Known Allergies  History reviewed. No pertinent past medical history.  Past Surgical History:   Procedure Laterality Date    CYST REMOVAL       No family history on file.  Social History[1]  Review of Systems   HENT:  Positive for ear pain, sneezing and sore throat.    All other systems reviewed and are negative.      Physical Exam     Initial Vitals [03/03/25 1709]   BP Pulse Resp Temp SpO2   (!) 112/94 82 16 98.6 °F (37 °C) 99 %      MAP       --         Physical Exam    Constitutional: She appears well-developed. She is not diaphoretic. No distress.   HENT:   Head: Normocephalic.   Right Ear: Tympanic membrane normal.   Left Ear: Tympanic membrane normal.   Nose: Nose normal. Mouth/Throat: No oral lesions. No trismus in the jaw. No uvula swelling. Posterior oropharyngeal erythema present. No oropharyngeal exudate.   Cerumen impaction noted to the right ear.   Eyes: Right eye exhibits no discharge. Left eye exhibits no discharge.   Cardiovascular:  Normal rate.           Pulmonary/Chest: No respiratory distress.   Musculoskeletal:         General: Normal range of motion.      Cervical back: No rigidity. No spinous process tenderness or muscular tenderness.     Neurological: She is alert and oriented to person, place, and time. She has normal strength.   Skin: Skin is warm and dry.   Psychiatric: She has a  normal mood and affect. Her behavior is normal. Judgment and thought content normal.         ED Course   Procedures  Labs Reviewed   GROUP A STREP, MOLECULAR       Result Value    Group A Strep, Molecular Negative     INFLUENZA A & B BY MOLECULAR   SARS-COV-2 RNA AMPLIFICATION, QUAL    SARS-CoV-2 RNA, Amplification, Qual Negative     POCT URINE PREGNANCY          Imaging Results    None          Medications   carbamide peroxide 6.5 % otic solution 5 drop (has no administration in time range)     Medical Decision Making  Differential Diagnosis includes, but is not limited to:  Malignant otitis externa, mastoiditis, cellulitis, abscess, TM rupture, foreign body, cerumen impaction, otitis media, otitis externa     Amount and/or Complexity of Data Reviewed  Labs:  Decision-making details documented in ED Course.    Risk  OTC drugs.               ED Course as of 03/03/25 1812   Mon Mar 03, 2025   1812 COVID-19 Rapid Screening [DT]   1812 Group A Strep, Molecular [DT]      ED Course User Index  [DT] Jacy De Los Santos, LEYDA                           Clinical Impression:  Final diagnoses:  [H61.21] Impacted cerumen of right ear (Primary)                   Jacy De Los Santos, LEYDA  03/03/25 1750         Jacy De Los Santos NP  03/03/25 1751       Jacy De Los Santos, LEYDA  03/03/25 1812         [1]   Social History  Tobacco Use    Smoking status: Never    Smokeless tobacco: Never   Substance Use Topics    Alcohol use: Yes     Comment: occasionally    Drug use: No        Jacy De Los Santos NP  03/03/25 1812

## 2025-03-03 NOTE — ED TRIAGE NOTES
Pt reports right ear pain and sore throat. She states this has been going on for a few days. She did not take anything otc for the pain but states she cleaned her ear out with peroxide.